# Patient Record
Sex: MALE | Race: OTHER | HISPANIC OR LATINO | URBAN - METROPOLITAN AREA
[De-identification: names, ages, dates, MRNs, and addresses within clinical notes are randomized per-mention and may not be internally consistent; named-entity substitution may affect disease eponyms.]

---

## 2024-10-14 ENCOUNTER — INPATIENT (INPATIENT)
Facility: HOSPITAL | Age: 49
LOS: 1 days | Discharge: LEFT BEFORE DISCHARGE | DRG: 281 | End: 2024-10-16
Attending: INTERNAL MEDICINE | Admitting: INTERNAL MEDICINE
Payer: COMMERCIAL

## 2024-10-14 VITALS — RESPIRATION RATE: 24 BRPM | WEIGHT: 169.98 LBS | HEIGHT: 70 IN | HEART RATE: 248 BPM

## 2024-10-14 DIAGNOSIS — I47.20 VENTRICULAR TACHYCARDIA, UNSPECIFIED: ICD-10-CM

## 2024-10-14 DIAGNOSIS — F14.10 COCAINE ABUSE, UNCOMPLICATED: ICD-10-CM

## 2024-10-14 DIAGNOSIS — E78.5 HYPERLIPIDEMIA, UNSPECIFIED: ICD-10-CM

## 2024-10-14 DIAGNOSIS — Z29.9 ENCOUNTER FOR PROPHYLACTIC MEASURES, UNSPECIFIED: ICD-10-CM

## 2024-10-14 DIAGNOSIS — I25.10 ATHEROSCLEROTIC HEART DISEASE OF NATIVE CORONARY ARTERY WITHOUT ANGINA PECTORIS: ICD-10-CM

## 2024-10-14 DIAGNOSIS — Z95.5 PRESENCE OF CORONARY ANGIOPLASTY IMPLANT AND GRAFT: Chronic | ICD-10-CM

## 2024-10-14 LAB
ALBUMIN SERPL ELPH-MCNC: 3.7 G/DL — SIGNIFICANT CHANGE UP (ref 3.3–5)
ALP SERPL-CCNC: 49 U/L — SIGNIFICANT CHANGE UP (ref 40–120)
ALT FLD-CCNC: 72 U/L — SIGNIFICANT CHANGE UP (ref 12–78)
ANION GAP SERPL CALC-SCNC: 12 MMOL/L — SIGNIFICANT CHANGE UP (ref 5–17)
APTT BLD: 35.5 SEC — SIGNIFICANT CHANGE UP (ref 24.5–35.6)
AST SERPL-CCNC: 70 U/L — HIGH (ref 15–37)
BASOPHILS # BLD AUTO: 0.06 K/UL — SIGNIFICANT CHANGE UP (ref 0–0.2)
BASOPHILS NFR BLD AUTO: 1 % — SIGNIFICANT CHANGE UP (ref 0–2)
BILIRUB SERPL-MCNC: 0.5 MG/DL — SIGNIFICANT CHANGE UP (ref 0.2–1.2)
BUN SERPL-MCNC: 19 MG/DL — SIGNIFICANT CHANGE UP (ref 7–23)
CALCIUM SERPL-MCNC: 9.5 MG/DL — SIGNIFICANT CHANGE UP (ref 8.5–10.1)
CHLORIDE SERPL-SCNC: 107 MMOL/L — SIGNIFICANT CHANGE UP (ref 96–108)
CO2 SERPL-SCNC: 24 MMOL/L — SIGNIFICANT CHANGE UP (ref 22–31)
CREAT SERPL-MCNC: 1.2 MG/DL — SIGNIFICANT CHANGE UP (ref 0.5–1.3)
EGFR: 75 ML/MIN/1.73M2 — SIGNIFICANT CHANGE UP
EOSINOPHIL # BLD AUTO: 0.21 K/UL — SIGNIFICANT CHANGE UP (ref 0–0.5)
EOSINOPHIL NFR BLD AUTO: 3.4 % — SIGNIFICANT CHANGE UP (ref 0–6)
GLUCOSE SERPL-MCNC: 179 MG/DL — HIGH (ref 70–99)
HCT VFR BLD CALC: 47.1 % — SIGNIFICANT CHANGE UP (ref 39–50)
HGB BLD-MCNC: 16.1 G/DL — SIGNIFICANT CHANGE UP (ref 13–17)
IMM GRANULOCYTES NFR BLD AUTO: 0.5 % — SIGNIFICANT CHANGE UP (ref 0–0.9)
INR BLD: 1.02 RATIO — SIGNIFICANT CHANGE UP (ref 0.85–1.16)
LYMPHOCYTES # BLD AUTO: 2.71 K/UL — SIGNIFICANT CHANGE UP (ref 1–3.3)
LYMPHOCYTES # BLD AUTO: 43.5 % — SIGNIFICANT CHANGE UP (ref 13–44)
MAGNESIUM SERPL-MCNC: 2.3 MG/DL — SIGNIFICANT CHANGE UP (ref 1.6–2.6)
MCHC RBC-ENTMCNC: 32.2 PG — SIGNIFICANT CHANGE UP (ref 27–34)
MCHC RBC-ENTMCNC: 34.2 GM/DL — SIGNIFICANT CHANGE UP (ref 32–36)
MCV RBC AUTO: 94.2 FL — SIGNIFICANT CHANGE UP (ref 80–100)
MONOCYTES # BLD AUTO: 0.71 K/UL — SIGNIFICANT CHANGE UP (ref 0–0.9)
MONOCYTES NFR BLD AUTO: 11.4 % — SIGNIFICANT CHANGE UP (ref 2–14)
NEUTROPHILS # BLD AUTO: 2.51 K/UL — SIGNIFICANT CHANGE UP (ref 1.8–7.4)
NEUTROPHILS NFR BLD AUTO: 40.2 % — LOW (ref 43–77)
NRBC # BLD: 0 /100 WBCS — SIGNIFICANT CHANGE UP (ref 0–0)
PLATELET # BLD AUTO: 408 K/UL — HIGH (ref 150–400)
POTASSIUM SERPL-MCNC: 4.1 MMOL/L — SIGNIFICANT CHANGE UP (ref 3.5–5.3)
POTASSIUM SERPL-SCNC: 4.1 MMOL/L — SIGNIFICANT CHANGE UP (ref 3.5–5.3)
PROT SERPL-MCNC: 8.2 G/DL — SIGNIFICANT CHANGE UP (ref 6–8.3)
PROTHROM AB SERPL-ACNC: 12 SEC — SIGNIFICANT CHANGE UP (ref 9.9–13.4)
RBC # BLD: 5 M/UL — SIGNIFICANT CHANGE UP (ref 4.2–5.8)
RBC # FLD: 13.1 % — SIGNIFICANT CHANGE UP (ref 10.3–14.5)
SODIUM SERPL-SCNC: 143 MMOL/L — SIGNIFICANT CHANGE UP (ref 135–145)
TROPONIN I, HIGH SENSITIVITY RESULT: 2185.9 NG/L — HIGH
TROPONIN I, HIGH SENSITIVITY RESULT: 34.1 NG/L — SIGNIFICANT CHANGE UP
WBC # BLD: 6.23 K/UL — SIGNIFICANT CHANGE UP (ref 3.8–10.5)
WBC # FLD AUTO: 6.23 K/UL — SIGNIFICANT CHANGE UP (ref 3.8–10.5)

## 2024-10-14 PROCEDURE — 93010 ELECTROCARDIOGRAM REPORT: CPT

## 2024-10-14 PROCEDURE — 99291 CRITICAL CARE FIRST HOUR: CPT

## 2024-10-14 PROCEDURE — 71045 X-RAY EXAM CHEST 1 VIEW: CPT | Mod: 26

## 2024-10-14 PROCEDURE — 99223 1ST HOSP IP/OBS HIGH 75: CPT

## 2024-10-14 PROCEDURE — 99222 1ST HOSP IP/OBS MODERATE 55: CPT | Mod: GC

## 2024-10-14 RX ORDER — ASPIRIN 325 MG
81 TABLET ORAL DAILY
Refills: 0 | Status: DISCONTINUED | OUTPATIENT
Start: 2024-10-15 | End: 2024-10-16

## 2024-10-14 RX ORDER — ETOMIDATE INJECTION 2 MG/ML
10 SOLUTION INTRAVENOUS ONCE
Refills: 0 | Status: COMPLETED | OUTPATIENT
Start: 2024-10-14 | End: 2024-10-14

## 2024-10-14 RX ORDER — ASPIRIN 325 MG
325 TABLET ORAL ONCE
Refills: 0 | Status: COMPLETED | OUTPATIENT
Start: 2024-10-14 | End: 2024-10-14

## 2024-10-14 RX ORDER — LIDOCAINE HCL 20 MG/ML
1 AMPUL (ML) INJECTION
Qty: 2 | Refills: 0 | Status: DISCONTINUED | OUTPATIENT
Start: 2024-10-14 | End: 2024-10-15

## 2024-10-14 RX ORDER — PANTOPRAZOLE SODIUM 40 MG/1
40 TABLET, DELAYED RELEASE ORAL DAILY
Refills: 0 | Status: DISCONTINUED | OUTPATIENT
Start: 2024-10-14 | End: 2024-10-15

## 2024-10-14 RX ORDER — SODIUM CHLORIDE 0.9 % (FLUSH) 0.9 %
1000 SYRINGE (ML) INJECTION ONCE
Refills: 0 | Status: COMPLETED | OUTPATIENT
Start: 2024-10-14 | End: 2024-10-14

## 2024-10-14 RX ORDER — ATORVASTATIN CALCIUM 10 MG/1
80 TABLET, FILM COATED ORAL AT BEDTIME
Refills: 0 | Status: DISCONTINUED | OUTPATIENT
Start: 2024-10-14 | End: 2024-10-16

## 2024-10-14 RX ADMIN — Medication 7.5 MG/MIN: at 21:03

## 2024-10-14 RX ADMIN — ATORVASTATIN CALCIUM 80 MILLIGRAM(S): 10 TABLET, FILM COATED ORAL at 22:33

## 2024-10-14 RX ADMIN — Medication 325 MILLIGRAM(S): at 20:52

## 2024-10-14 RX ADMIN — Medication 6 MILLIGRAM(S): at 18:31

## 2024-10-14 RX ADMIN — ETOMIDATE INJECTION 10 MILLIGRAM(S): 2 SOLUTION INTRAVENOUS at 18:31

## 2024-10-14 RX ADMIN — Medication 4700 UNIT(S): at 22:33

## 2024-10-14 RX ADMIN — Medication 2000 MILLILITER(S): at 19:06

## 2024-10-14 RX ADMIN — Medication 950 UNIT(S)/HR: at 22:26

## 2024-10-14 NOTE — ED PROVIDER NOTE - OBJECTIVE STATEMENT
Patient with a past medical history of coronary artery disease who does not take medication regularly is presenting with palpitations, chest pain and dizziness.  States about 20 minutes prior to arrival, developed palpitations and the other symptoms above, was also diaphoretic.  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  No history of similar episode.

## 2024-10-14 NOTE — ED ADULT TRIAGE NOTE - CHIEF COMPLAINT QUOTE
Pt BIBA from work at the [employer] for tachycardia, dizziness and SOB. Pt found to have HR in 240's. UTO BP during triage or en route with EMS. Hx 2 MI's

## 2024-10-14 NOTE — H&P ADULT - NSICDXPASTMEDICALHX_GEN_ALL_CORE_FT
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

## 2024-10-14 NOTE — ED PROVIDER NOTE - CLINICAL SUMMARY MEDICAL DECISION MAKING FREE TEXT BOX
Patient found to be in a wide-complex tachycardia with heart rate in the 260s, concern for ventricular tachycardia.  Patient mentating well at that time but we were otherwise unable to obtain a blood pressure.  While attempting to get set up for cardioversion, adenosine was given without response.  Afterwards patient started vomiting but was otherwise still mentating.  Given the acuity and emergent nature of the situation, it was decided to perform a synchronized cardioversion for this patient.  Verbal consent was obtained due to the emergent nature of this condition.  Dr. Coleman from cardiology was at bedside as well during this procedure.  Afterwards, patient converted to sinus rhythm.  Endorsed using cocaine yesterday.  States he is feeling better.  Interventional cardiology was also consulted by Dr. Coleman, states that they will watch him overnight and possibly do a catheterization tomorrow.  Case discussed with Dr. Coleman, will withhold antiarrhythmic at this time as his symptoms were likely incited from cocaine use yesterday.

## 2024-10-14 NOTE — H&P ADULT - HISTORY OF PRESENT ILLNESS
yo F/M with PMH presents to the ED with     Denies fever, chills, chest pain, palpitations, SOB, cough, abdominal pain, nausea, vomiting, diarrhea, constipation, urinary frequency, urgency, or dysuria, headaches, changes in vision, dizziness, numbness, tingling.  Denies recent travel, recent antibiotic use, or sick contacts.    ED Course:   Vitals: BP: , HR: , Temp: , RR: , SpO2: % on   Labs:    ABG: pH: , PO2: , PCO2: , HCO3: , SpO2: %  UA:   CXR: as per personal read, official read pending   CT:  EKG:   Received in the ED    48 yo M with PMHx of coronary artery disease, htn who does not take medication regularly is presenting with palpitations, chest pain and dizziness.  States about 20 minutes prior to arrival, developed palpitations and the other symptoms above, was also diaphoretic.  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  No history of similar episode.    Denies fever, chills, chest pain, palpitations, SOB, cough, abdominal pain, nausea, vomiting, diarrhea, constipation, urinary frequency, urgency, or dysuria, headaches, changes in vision, dizziness, numbness, tingling.  Denies recent travel, recent antibiotic use, or sick contacts.    ED Course:   Vitals: BP: , HR: , Temp: , RR: , SpO2: % on   Labs:    ABG: pH: , PO2: , PCO2: , HCO3: , SpO2: %  UA:   CXR: as per personal read, official read pending   CT:  EKG:   Received in the ED    48 yo M with PMHx of coronary artery disease (s/p 7 stents), htn who does not take medication regularly is presenting with palpitations, chest pain and dizziness.  States about 20 minutes prior to arrival, developed palpitations and the other symptoms above, was also diaphoretic.  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  No history of similar episode.      ED Course:   Vitals: BP: , HR: , Temp: , RR: , SpO2: % on   Labs:    ABG: pH: , PO2: , PCO2: , HCO3: , SpO2: %  UA:   CXR: as per personal read, official read pending   CT:  EKG:   Received in the ED    48 yo M with PMHx of coronary artery disease (s/p 7 stents, most recently in 2017), 2 MI (2001, 2017), HLD, prior DVT in upper extremity, recent stomach cancer s/p chemo, cocaine use, who does not take medication regularly (inconsistently taking ASA 81mg, stopped his statin, losartan, metoprolol) is presenting with palpitations, chest pain and dizziness.  States about 20 minutes prior to arrival, developed palpitations and the other symptoms above, was also diaphoretic.  Went to [employer] [facility] where he works and 911 was called and they brought him to ED for evaluation. Endorses using cocaine 2 days ago, uses it a couple of times a month for the past few years.  No history of similar episode. At time of interview, patient had no acute complaints and denied chest pain, SOB, nausea/vomiting.      ED Course:   Vitals: BP: 112/77, HR: 248, Temp: pending, RR: 24, SpO2: 95% on nonrebreather  Labs: platelet 408, glucose 179, AST 70  CXR: cardiomegaly, b/l haziness throughout all lung fields, possible poor positioning during XR, as per personal read, official read pending   EKG on arrival: wide QRS tachycardia with PVCs, right ventricular hypertrophy, ventricular rate 266, QTc 421 --> sinus rhythm with PVcs, old inferior and old anterolateral infarcts, ventricular rate 84 --> sinus rhythm with sinus arrhythmia, low voltage QRS, old inferior and old anterolateral infarcts, ventricular rate 68  Received in the ED: adenosine 6mg IV, ASA 325mg PO, etomidate 10mg IV, NS 1L IV bolus, lidocaine drip 1mg/min 50 yo M with PMHx of coronary artery disease (s/p 7 stents, most recently in 2017), 2 MI (2001, 2017), HLD, prior DVT in upper extremity, recent stomach cancer s/p chemo, cocaine use, who does not take medication regularly (inconsistently taking ASA 81mg, stopped his statin, losartan, metoprolol) is presenting with palpitations, chest pain and dizziness.  States about 20 minutes prior to arrival, developed palpitations and the other symptoms above, was also diaphoretic.  Went to [employer] [facility] where he works and 911 was called and they brought him to ED for evaluation. Endorses using cocaine 2 days ago, uses it a couple of times a month for the past few years.  No history of similar episode. At time of interview, patient had no acute complaints and denied chest pain, SOB, nausea/vomiting.      ED Course:   Vitals: BP: 112/77, HR: 248, Temp: pending, RR: 24, SpO2: 95% on nonrebreather  Labs: platelet 408, glucose 179, AST 70, Trop wnl x1, 2nd trop 2185  CXR: cardiomegaly, b/l haziness throughout all lung fields, possible poor positioning during XR, as per personal read, official read pending   EKG on arrival: wide QRS tachycardia with PVCs, right ventricular hypertrophy, ventricular rate 266, QTc 421 --> sinus rhythm with PVcs, old inferior and old anterolateral infarcts, ventricular rate 84 --> sinus rhythm with sinus arrhythmia, low voltage QRS, old inferior and old anterolateral infarcts, ventricular rate 68  Received in the ED: adenosine 6mg IV, ASA 325mg PO, etomidate 10mg IV, NS 1L IV bolus, lidocaine drip 1mg/min 48 yo M with PMHx of coronary artery disease (s/p 7 stents, most recently in 2017), 2 MI (2001, 2017), HLD, prior DVT in upper extremity, recent stomach cancer s/p chemo, cocaine use, who does not take medication regularly (inconsistently taking ASA 81mg, stopped his statin, losartan, metoprolol) is presenting with palpitations, chest pain and dizziness.  States about 20 minutes prior to arrival, developed palpitations and the other symptoms above, was also diaphoretic.  Went to [employer] [facility] where he works and 911 was called and they brought him to ED for evaluation. Endorses using cocaine 2 days ago, uses it a couple of times a month for the past few years.  No history of similar episode. At time of interview, patient had no acute complaints and denied chest pain, SOB, nausea/vomiting.    ED Course:   Vitals: BP: 112/77, HR: 248, Temp: pending, RR: 24, SpO2: 95% on nonrebreather  Labs: platelet 408, glucose 179, AST 70, Trop wnl x1, 2nd trop 2185  CXR: cardiomegaly, b/l haziness throughout all lung fields, possible poor positioning during XR, as per personal read, official read pending   EKG on arrival: wide QRS tachycardia with PVCs, right ventricular hypertrophy, ventricular rate 266, QTc 421 --> sinus rhythm with PVcs, old inferior and old anterolateral infarcts, ventricular rate 84 --> sinus rhythm with sinus arrhythmia, low voltage QRS, old inferior and old anterolateral infarcts, ventricular rate 68  Received in the ED: adenosine 6mg IV, ASA 325mg PO, etomidate 10mg IV, NS 1L IV bolus, lidocaine drip 1mg/min 50 yo M with PMHx of coronary artery disease (s/p 7 stents, most recently in 2017), 2 NSTEMI (2001, 2017), cocaine use, HLD, prior DVT in upper extremity, recent stomach cancer s/p chemo, who does not take medication regularly (inconsistently taking ASA 81mg, stopped his statin, losartan, metoprolol) is presenting with palpitations, chest pain and dizziness.  States about 20 minutes prior to arrival, developed palpitations and the other symptoms above, was also diaphoretic.  Went to [employer] [facility] where he works and 911 was called and they brought him to ED for evaluation. Endorses using cocaine 2 days ago, uses it a couple of times a month for the past few years.  No history of similar episode. At time of interview, patient had no acute complaints and denied chest pain, SOB, nausea/vomiting.    ED Course:   Vitals: BP: 112/77, HR: 248, Temp: pending, RR: 24, SpO2: 95% on nonrebreather  Labs: platelet 408, glucose 179, AST 70, Trop wnl x1, 2nd trop 2185  CXR: cardiomegaly, b/l haziness throughout all lung fields, possible poor positioning during XR, as per personal read, official read pending   EKG on arrival: wide QRS tachycardia with PVCs, right ventricular hypertrophy, ventricular rate 266, QTc 421 --> sinus rhythm with PVcs, old inferior and old anterolateral infarcts, ventricular rate 84 --> sinus rhythm with sinus arrhythmia, low voltage QRS, old inferior and old anterolateral infarcts, ventricular rate 68  Received in the ED: adenosine 6mg IV, ASA 325mg PO, etomidate 10mg IV, NS 1L IV bolus, lidocaine drip 1mg/min 50 yo M with PMHx of NSTEMI (2001, 2017) s/p 7 stents, coronary artery disease, cocaine use, HLD, prior DVT in upper extremity, recent stomach cancer s/p chemo, who does not take medication regularly (inconsistently taking ASA 81mg, stopped his statin, losartan, metoprolol) is presenting with palpitations, chest pain and dizziness.  States about 20 minutes prior to arrival, developed palpitations and the other symptoms above, was also diaphoretic.  Went to [facility] center where he works and 911 was called and they brought him to ED for evaluation. Endorses using cocaine 2 days ago, uses it a couple of times a month for the past few years.  No history of similar episode. At time of interview, patient had no acute complaints and denied chest pain, SOB, nausea/vomiting.    ED Course:   Vitals: BP: 112/77, HR: 248, Temp: pending, RR: 24, SpO2: 95% on nonrebreather  Labs: platelet 408, glucose 179, AST 70, Trop wnl x1, 2nd trop 2185  CXR: cardiomegaly, b/l haziness throughout all lung fields, possible poor positioning during XR, as per personal read, official read pending   EKG on arrival: wide QRS tachycardia with PVCs, right ventricular hypertrophy, ventricular rate 266, QTc 421 --> sinus rhythm with PVcs, old inferior and old anterolateral infarcts, ventricular rate 84 --> sinus rhythm with sinus arrhythmia, low voltage QRS, old inferior and old anterolateral infarcts, ventricular rate 68  Received in the ED: adenosine 6mg IV, ASA 325mg PO, etomidate 10mg IV, NS 1L IV bolus, lidocaine drip 1mg/min

## 2024-10-14 NOTE — ED PROVIDER NOTE - PHYSICAL EXAMINATION
Constitutional: Awake, Alert, acutely ill, diaphoretic  HEAD: Normocephalic, atraumatic.   EYES: PERRL, EOM intact, conjunctiva and sclera are clear bilaterally.  ENT: External ears normal. No rhinorrhea, no tracheal deviation   NECK: Supple, non-tender  CARDIOVASCULAR: tachycardic rate and rhythm.  RESPIRATORY: Normal respiratory effort; breath sounds CTAB, no wheezes, rhonchi, or rales.   ABDOMEN: Soft; non-tender, non-distended. No rebound or guarding.   MSK:  no lower extremity edema, no deformities  SKIN: diaphoretic  NEURO: A&O x3. Sensory and motor functions are grossly intact. Speech is normal. No facial droop.

## 2024-10-14 NOTE — ED PROCEDURE NOTE - CPROC ED TIME OUT STATEMENT1
“Patient's name, , procedure and correct site were confirmed during the [place] Timeout.”
“Patient's name, , procedure and correct site were confirmed during the [place] Timeout.”

## 2024-10-14 NOTE — CONSULT NOTE ADULT - SUBJECTIVE AND OBJECTIVE BOX
49y  Male  No Known Allergies    Patient is a 49y old  Male who presented with a chief complaint of  SOB, palpitations and tachycardia    HPI: 49 year old male PMHx of HTN and CAD (7 stents) who admited to cocaine yesterday presented to ER via EMS with of SOB, palpitations, tachycardia. He was diaphoretic and tachycardic found to be in wide complex tachycardia with rate upwards of at 250 -270 . Given the inability to obtain a BP at that time the decision made for cardioversion/defibrillation x1,  and he converted to normal sinus rhythm . Post cardioversion EKG showed NSR with anterior infarct/ischemia and at this time he is awake alert and oriented x3,  hemodynamically stable normal blood pressure, heart rate in the 80's with no complaints     PAST MEDICAL & SURGICAL HISTORY:  HTN (hypertension)  Cocaine abuse    Vital Signs Last 24 Hrs  T(C): --  T(F): --  HR: 83 (14 Oct 2024 18:36) (55 - 269)  BP: 113/88 (14 Oct 2024 18:36) (112/77 - 113/88)  BP(mean): --  RR: 18 (14 Oct 2024 18:36) (18 - 25)  SpO2: 95% (14 Oct 2024 18:36) (94% - 96%)    Parameters below as of 14 Oct 2024 18:36  Patient On (Oxygen Delivery Method): mask, nonrebreather    LABS  10-14  143  |  107  |  19  ----------------------------<  179[H]  4.1   |  24  |  1.20  Ca    9.5      14 Oct 2024 18:30  Mg     2.3     10-14  TPro  8.2  /  Alb  3.7  /  TBili  0.5  /  DBili  x   /  AST  70[H]  /  ALT  72  /  AlkPhos  49  10-14                        16.1   6.23  )-----------( 408      ( 14 Oct 2024 18:30 )             47.1   PT/INR - ( 14 Oct 2024 18:30 )   PT: 12.0 sec;   INR: 1.02 ratio     PTT - ( 14 Oct 2024 18:30 )  PTT:35.5 sec    LIVER FUNCTIONS - ( 14 Oct 2024 18:30 )  Alb: 3.7 g/dL / Pro: 8.2 g/dL / ALK PHOS: 49 U/L / ALT: 72 U/L / AST: 70 U/L / GGT: x           CAPILLARY BLOOD GLUCOSE  POCT Blood Glucose.: 169 mg/dL (14 Oct 2024 18:23)    Urinalysis Basic - ( 14 Oct 2024 18:30 )  Color: x / Appearance: x / SG: x / pH: x  Gluc: 179 mg/dL / Ketone: x  / Bili: x / Urobili: x   Blood: x / Protein: x / Nitrite: x   Leuk Esterase: x / RBC: x / WBC x   Sq Epi: x / Non Sq Epi: x / Bacteria: x    MEDICATIONS  (STANDING):  heparin   Injectable 5000 Unit(s) SubCutaneous every 12 hours  lidocaine   Infusion 1 mG/Min (7.5 mL/Hr) IV Continuous <Continuous>  pantoprazole  Injectable 40 milliGRAM(s) IV Push daily      REVIEW OF SYSTEMS:  CONSTITUTIONAL: No fever, weight loss, or fatigue  EYES: No eye pain, visual disturbances, or discharge  ENMT:  No difficulty hearing, tinnitus, vertigo; No sinus or throat pain  NECK: No pain or stiffness  BREASTS: No pain, masses, or nipple discharge  RESPIRATORY: No cough, wheezing, chills or hemoptysis; No shortness of breath  CARDIOVASCULAR: No chest pain, palpitations, dizziness, or leg swelling  GASTROINTESTINAL: No abdominal or epigastric pain. No nausea, vomiting, or hematemesis; No diarrhea or constipation. No melena or hematochezia.  GENITOURINARY: No dysuria, frequency, hematuria, or incontinence  NEUROLOGICAL: No headaches, memory loss, loss of strength, numbness, or tremors  SKIN: No itching, burning, rashes, or lesions   LYMPH NODES: No enlarged glands  ENDOCRINE: No heat or cold intolerance; No hair loss  MUSCULOSKELETAL: No joint pain or swelling; No muscle, back, or extremity pain  PSYCHIATRIC: No depression, anxiety, mood swings, or difficulty sleeping  HEME/LYMPH: No easy bruising, or bleeding gums  ALLERY AND IMMUNOLOGIC: No hives or eczema      Physicial Exam:   Constitutional: NAD, well-groomed, well-developed  HEENT: PERRLA, EOMI, no drainage or redness  Neck: No bruits; no thyromegaly or nodules,  No JVD  Back: Normal spine flexure, No CVA tenderness, No deformity or limitation of movement  Respiratory: Breath Sounds equal & clear to percussion & auscultation, no accessory muscle use  Cardiovascular: Regular rate & rhythm, normal S1, S2; no murmurs, gallops or rubs; no S3, S4  Gastrointestinal: Soft, non-tender, non distended no hepatosplenomegaly, normal bowel sounds  Extremities: No peripheral edema, No cyanosis, clubbing   Vascular: Equal and normal pulses: 2+ peripheral pulses throughout  Neurological: GCS:    A&O x 3; no sensory, motor  deficits, normal reflexes  Psychiatric: Normal mood, normal affect  Musculoskeletal: No joint pain, swelling or deformity; no limitation of movement  Skin: No rashes

## 2024-10-14 NOTE — ED ADULT NURSE NOTE - ED STAT RN HANDOFF DETAILS
Report given to EBONY Miranda in ICU. Pt taken to ICU w/ ED transport and RN escort in stable condition.

## 2024-10-14 NOTE — H&P ADULT - PROBLEM SELECTOR PLAN 2
- Chronic  - Pt formerly took statin, losartan, metoprolol, now takes asa 81mg inconsistently  - Avoid beta blocker in setting of cocaine use  - S/p  mg, then start 81 mg daily  - Plan per ICU

## 2024-10-14 NOTE — CONSULT NOTE ADULT - SUBJECTIVE AND OBJECTIVE BOX
[facility] - Ignacio Santos, Tahir Brandon Savella, Cohen  Office Number: [phone]    Initial Consult Note    CHIEF COMPLAINT: Patient is a 49y old  Male who presents with a chief complaint of     HPI:  49 year old male with CAD (reports 7 stents in past), here with palpitations/dyspnea/tachycardia, brought in by EMS.  Cannot recall the name of his cardiologist, and reports taking only a baby asa.  On arrival, appears diaphoretic, nausea/vomiting, and tachycardic. Found to be in a WCT at 250+ bpm.  Unable to obtain a BP  Decision made for cardioversion/defibrillation, given 1 shock and went back into sr  Post VT ekg with sr with anterior infarct/ischemia  feeling well post cardioversion  does admit to cocaine yesterday    PAST MEDICAL & SURGICAL HISTORY:      SOCIAL HISTORY:  No tobacco, ethanol,, +drug use    FAMILY HISTORY:    No family history of acute MI or sudden cardiac death.    MEDICATIONS  (STANDING):    MEDICATIONS  (PRN):      Allergies    No Known Allergies    Intolerances        REVIEW OF SYSTEMS:    CONSTITUTIONAL: No weakness, fevers or chills  EYES/ENT: No visual changes;  No vertigo or throat pain   NECK: No pain or stiffness  RESPIRATORY: No cough, wheezing, hemoptysis; No shortness of breath  CARDIOVASCULAR: No chest pain or palpitations  GASTROINTESTINAL: No abdominal pain. No nausea, vomiting, or hematemesis; No diarrhea or constipation. No melena or hematochezia.  GENITOURINARY: No dysuria, frequency or hematuria  NEUROLOGICAL: No numbness or weakness  SKIN: No itching or rash  All other review of systems is negative unless indicated above    VITAL SIGNS:   Vital Signs Last 24 Hrs  T(C): --  T(F): --  HR: 83 (14 Oct 2024 18:36) (55 - 269)  BP: 113/88 (14 Oct 2024 18:36) (112/77 - 113/88)  BP(mean): --  RR: 18 (14 Oct 2024 18:36) (18 - 25)  SpO2: 95% (14 Oct 2024 18:36) (94% - 96%)    Parameters below as of 14 Oct 2024 18:36  Patient On (Oxygen Delivery Method): mask, nonrebreather        I&O's Summary      On Exam:    Constitutional: alert and oriented x 3, diaphoretic  Lungs:  Non-labored, breath sounds are decreased bilaterally, No wheezing, rales or rhonchi  Cardiovascular: tachy,  S1 and S2 positive.  No murmurs, rubs, gallops or clicks  Gastrointestinal: Bowel Sounds present, soft, nontender.   Lymph: No peripheral edema. No cervical lymphadenopathy.  Neurological: Alert, no focal deficits  Skin: No rashes or ulcers   Psych:  Mood & affect appropriate.    LABS: All Labs Reviewed:                        16.1   6.23  )-----------( 408      ( 14 Oct 2024 18:30 )             47.1     14 Oct 2024 18:30    143    |  107    |  19     ----------------------------<  179    4.1     |  24     |  1.20     Ca    9.5        14 Oct 2024 18:30  Mg     2.3       14 Oct 2024 18:30    TPro  8.2    /  Alb  3.7    /  TBili  0.5    /  DBili  x      /  AST  70     /  ALT  72     /  AlkPhos  49     14 Oct 2024 18:30    PT/INR - ( 14 Oct 2024 18:30 )   PT: 12.0 sec;   INR: 1.02 ratio         PTT - ( 14 Oct 2024 18:30 )  PTT:35.5 sec      Blood Culture:         RADIOLOGY:    EKG: wct

## 2024-10-14 NOTE — H&P ADULT - PROBLEM SELECTOR PLAN 1
- - Pt admitted for symptomatic VTach, s/p defibrillation/cardioversion 200J  - Trop wnl x1, 2nd trop 2185  - S/p adenosine 6mg IV, ASA 325mg PO, etomidate 10mg IV, NS 1L IV bolus, lidocaine drip 1mg/min  - Admit to ICU for further management, at risk for decompensation  - Per cardio, heparin gtt and acs protocol given 2nd trop elevated  - Plan for cath in AM  - NPO after midnight  - Avoid beta blocker in setting of cocaine use  - Recommend check TTE  - trend creatinine and electrolytes. Keep K>4, mg>2  - very high risk of decompensation  - Plan per ICU team  - Cardio Dr. Coleman consulted, recs appreciated - Pt admitted for symptomatic VTach, s/p defibrillation/cardioversion 200J  - Trop wnl x1, 2nd trop 2185  - S/p adenosine 6mg IV, ASA 325mg PO, etomidate 10mg IV, NS 1L IV bolus, lidocaine drip 1mg/min  - Admit to ICU for further management, at risk for decompensation  - Per cardio, start heparin gtt and acs protocol given 2nd trop elevated  - Continue lidocaine gtt  - Plan for cath in AM  - NPO after midnight  - Avoid beta blocker in setting of cocaine use  - Pantoprazole for GI ppx  - Recommend check TTE  - trend creatinine and electrolytes. Keep K>4, mg>2  - very high risk of decompensation  - Plan per ICU team  - Cardio Dr. Coleman consulted, recs appreciated - Pt admitted for symptomatic VTach, s/p defibrillation/cardioversion 200J - likely ichemic heart disease in the setting of cocaine abuse and CAD  - Trop wnl x1, 2nd trop 2185  - S/p adenosine 6mg IV, ASA 325mg PO, etomidate 10mg IV, NS 1L IV bolus, lidocaine drip 1mg/min  - Admit to ICU for further management, at risk for decompensation  - Per cardio, start heparin gtt and acs protocol given 2nd trop elevated  - Continue lidocaine gtt  - Plan for cath in AM  - NPO after midnight  - Avoid beta blocker in setting of cocaine use  - Pantoprazole for GI ppx  - Recommend check TTE  - trend creatinine and electrolytes. Keep K>4, mg>2  - very high risk of decompensation  - Plan per ICU team  - Cardio Dr. Coleman consulted, recs appreciated - Pt admitted for symptomatic VTach, s/p defibrillation/cardioversion 200J - likely ichemic heart disease in the setting of cocaine abuse and CAD  - Trop wnl x1, 2nd trop 2185  - EKG on arrival: wide QRS tachycardia with PVCs, right ventricular hypertrophy, ventricular rate 266, QTc 421 --> sinus rhythm with PVcs, old inferior and old anterolateral infarcts, ventricular rate 84 --> sinus rhythm with sinus arrhythmia, low voltage QRS, old inferior and old anterolateral infarcts, ventricular rate 68  - S/p adenosine 6mg IV, ASA 325mg PO, etomidate 10mg IV, NS 1L IV bolus, lidocaine drip 1mg/min  - Admit to ICU for further management, at risk for decompensation  - Per cardio, start heparin gtt and acs protocol given 2nd trop elevated  - Continue lidocaine gtt  - Plan for cath in AM  - NPO after midnight  - Avoid beta blocker in setting of cocaine use  - Pantoprazole for GI ppx  - Recommend check TTE  - Trend creatinine and electrolytes. Keep K>4, mg>2  - Very high risk of decompensation  - Cardio Dr. Coleman consulted, recs appreciated  - Plan per ICU team - Pt with PMHx NSTEMI s/p 7 stents, admitted for symptomatic VTach, s/p defibrillation/cardioversion 200J - likely ichemic heart disease in the setting of cocaine abuse and CAD  - Trop wnl x1, 2nd trop 2185  - EKG on arrival: wide QRS tachycardia with PVCs, right ventricular hypertrophy, ventricular rate 266, QTc 421 --> sinus rhythm with PVcs, old inferior and old anterolateral infarcts, ventricular rate 84 --> sinus rhythm with sinus arrhythmia, low voltage QRS, old inferior and old anterolateral infarcts, ventricular rate 68  - S/p adenosine 6mg IV, ASA 325mg PO, etomidate 10mg IV, NS 1L IV bolus, lidocaine drip 1mg/min  - Admit to ICU for further management, at risk for decompensation  - Per cardio, start heparin gtt and acs protocol given 2nd trop elevated  - Continue lidocaine gtt  - Plan for cath in AM  - NPO after midnight  - Avoid beta blocker in setting of cocaine use  - Pantoprazole for GI ppx  - Recommend check TTE  - Trend creatinine and electrolytes. Keep K>4, mg>2  - Very high risk of decompensation  - Cardio Dr. Coleman consulted, recs appreciated  - Plan per ICU team - Pt with PMHx NSTEMI s/p 7 stents, admitted for symptomatic VTach, s/p defibrillation/cardioversion 200J - likely ischemic heart disease in the setting of cocaine abuse and CAD  - Trop wnl x1, 2nd trop 2185  - EKG on arrival: wide QRS tachycardia with PVCs, right ventricular hypertrophy, ventricular rate 266, QTc 421 --> sinus rhythm with PVcs, old inferior and old anterolateral infarcts, ventricular rate 84 --> sinus rhythm with sinus arrhythmia, low voltage QRS, old inferior and old anterolateral infarcts, ventricular rate 68  - S/p adenosine 6mg IV, ASA 325mg PO, etomidate 10mg IV, NS 1L IV bolus, lidocaine drip 1mg/min  - Admit to ICU for further management, at risk for decompensation  - Per cardio, start heparin gtt and acs protocol given 2nd trop elevated  - Continue lidocaine gtt  - Plan for cath in AM  - NPO after midnight  - Avoid beta blocker in setting of cocaine use  - Pantoprazole for GI ppx  - Recommend check TTE  - Trend creatinine and electrolytes. Keep K>4, mg>2  - Very high risk of decompensation  - Cardio Dr. Coleman consulted, recs appreciated  - Plan per ICU team

## 2024-10-14 NOTE — H&P ADULT - NSHPPHYSICALEXAM_GEN_ALL_CORE
T(C): --  HR: 83 (10-14-24 @ 18:36) (55 - 269)  BP: 113/88 (10-14-24 @ 18:36) (112/77 - 113/88)  RR: 18 (10-14-24 @ 18:36) (18 - 25)  SpO2: 95% (10-14-24 @ 18:36) (94% - 96%)    GENERAL: patient appears well, no acute distress, appropriately interactive  EYES: sclera clear, no exudates  ENMT: oropharynx clear without erythema, no exudates, moist mucous membranes  NECK: supple, soft  LUNGS: good air entry bilaterally, clear to auscultation, symmetric breath sounds, no wheezing or rhonchi appreciated  HEART: soft S1/S2, regular rate and rhythm, no murmurs noted, no lower extremity edema  GASTROINTESTINAL: abdomen is soft, nontender, nondistended, normoactive bowel sounds  INTEGUMENT: good skin turgor, warm skin, appears well perfused  MUSCULOSKELETAL: no clubbing or cyanosis, no obvious deformity  NEUROLOGIC: awake, alert, oriented x3, good muscle tone in all 4 extremities  HEME/LYMPH: no obvious ecchymosis or petechiae T(C): --  HR: 83 (10-14-24 @ 18:36) (55 - 269)  BP: 113/88 (10-14-24 @ 18:36) (112/77 - 113/88)  RR: 18 (10-14-24 @ 18:36) (18 - 25)  SpO2: 95% (10-14-24 @ 18:36) (94% - 96%)    GENERAL: patient comfortable, no acute distress, appropriately interactive  LUNGS: good air entry bilaterally, clear to auscultation, symmetric breath sounds, no wheezing or rhonchi appreciated  HEART: soft S1/S2, regular rate and rhythm, no murmurs noted, no lower extremity edema  GASTROINTESTINAL: abdomen is soft, nontender, nondistended  INTEGUMENT: warm, dry  MUSCULOSKELETAL: no clubbing or cyanosis, no obvious deformity  NEUROLOGIC: awake and alert, good muscle tone in all 4 extremities T(C): --  HR: 83 (10-14-24 @ 18:36) (55 - 269)  BP: 113/88 (10-14-24 @ 18:36) (112/77 - 113/88)  RR: 18 (10-14-24 @ 18:36) (18 - 25)  SpO2: 95% (10-14-24 @ 18:36) (94% - 96%)    GENERAL: patient comfortable, no acute distress, appropriately interactive  EYES: sclera clear, no exudates  LUNGS: good air entry bilaterally, clear to auscultation, symmetric breath sounds, no wheezing or rhonchi appreciated  HEART: soft S1/S2, regular rate and rhythm, no murmurs noted, no lower extremity edema  GASTROINTESTINAL: abdomen is soft, nontender, nondistended  INTEGUMENT: warm, dry  MUSCULOSKELETAL: no clubbing or cyanosis, no obvious deformity  NEUROLOGIC: awake and alert, good muscle tone in all 4 extremities  HEME/LYMPH: no obvious ecchymosis or petechiae

## 2024-10-14 NOTE — H&P ADULT - ASSESSMENT
49 year old male with PMHx CAD s/p 7 stents, 2 prior MI, HLD, prior upper extremity DVT, recent stomach cancer s/p chemo, cocaine use, who presented with chest pain, palpitations, diaphoresis. Found to be in wide complex ventricular tachycardia, defibrillated x1 with return to sinus rhythm with some sinus arrhythmia. Admitted to ICU for further management. 49 year old male with PMHx NSTEMI (2001, 2017) s/p 7 stents, coronary artery disease, HLD, prior upper extremity DVT, recent stomach cancer s/p chemo, cocaine use, who presented with chest pain, palpitations, diaphoresis. Found to be in wide complex ventricular tachycardia, defibrillated x1 with return to sinus rhythm with some sinus arrhythmia. Admitted to ICU for further management.

## 2024-10-14 NOTE — ED ADULT TRIAGE NOTE - GLASGOW COMA SCALE: SCORE, MLM
The right coronary artery was selectively engaged and injected. Multiple views of the injected vessel were taken.  15

## 2024-10-14 NOTE — H&P ADULT - PROBLEM SELECTOR PLAN 4
- Pt with hx of cocaine use, states that he uses it a few times a month, every few months  - F/u Utox  - Avoid beta blocker in setting of cocaine use  - Recommend consult Dr. Young, addiction specialist  - Plan per ICU - Pt with hx of cocaine use, states that he uses it a few times a month, every few months  - F/u Utox  - Avoid beta blocker in setting of cocaine use  - Counseled patient on tobacco and cocaine cessation  - Recommend consult Dr. Young, addiction specialist  - Plan per ICU

## 2024-10-14 NOTE — ED ADULT NURSE NOTE - NSFALLHARMRISKINTERV_ED_ALL_ED
Assistance with ambulation/Communicate risk of Fall with Harm to all staff, patient, and family/Provide visual cue: red socks, yellow wristband, yellow gown, etc/Reinforce activity limits and safety measures with patient and family/Bed in lowest position, wheels locked, appropriate side rails in place/Call bell, personal items and telephone in reach/Instruct patient to call for assistance before getting out of bed/chair/stretcher/Non-slip footwear applied when patient is off stretcher/[place] [place] to call system/Physically safe environment - no spills, clutter or unnecessary equipment/Purposeful Proactive Rounding/Room/bathroom lighting operational, light cord in reach

## 2024-10-14 NOTE — ED ADULT NURSE NOTE - OBJECTIVE STATEMENT
Patient BIBEMS from work after c.o chest pain and discomfort, diaphoretic, dizzinesss and nausea. Patient unable to describe past medical hx at this time 2/2 acuity of illness - Patient HR 260BPM at this time - ekg showing VTACH  - cardio at bedside, patient placed on cardiac pacing pads. Vomit x2 in stretcher.   10mg etomidate admin prior to shock for sedation @1831. Patient refused fentanyl. Patient to be shockled @200j per CARDIO/ER doc @1832  1833 HR now 55, 26 resp, 112/74 bp  Post shock patient now NSR. Patient awake and alert, laughing and being cooperative. Patient endorses cocaine use yesterday. Pending further labs and radiology reports. Patient BIBEMS from work after c.o chest pain and discomfort, diaphoretic, dizzinesss and nausea. Patient unable to describe past medical hx at this time 2/2 acuity of illness - Patient HR 260BPM at this time - ekg showing VTACH  - cardio at bedside, patient placed on cardiac pacing pads. Vomit x2 in stretcher.   10mg etomidate admin prior to shock for sedation @1831. Patient refused fentanyl. Patient to be shockled @200j per CARDIO/ER doc @1832 1833 HR now 55, 26 resp, 112/74 bp  Post shock patient now NSR. Patient awake and alert, laughing and being cooperative. Patient endorses cocaine use yesterday. Hx of 7 stents. Pending further labs and radiology reports.

## 2024-10-14 NOTE — ED PROVIDER NOTE - PROGRESS NOTE DETAILS
Case discussed with ICU MARCOS Titus as well as hospitalist Dr. Bauman, accepted to ICU.  Manjidner Salas MD.

## 2024-10-14 NOTE — ED PROVIDER NOTE - NSTIMEPROVIDERCAREINITIATE_GEN_ER
[facility]: Tikosyn (Dofetilide) Education    Zheng Lopez was initiated on tikosyn for atrial fibrillation. Counseling points included the followin) Explained indication and need for tikosyn for atrial fibrillation, and the testing and labs needed during the initiation phase (EKGs, potassium, magnesium, renal function).  2) Went over dosing and frequency of this medication, stressing importance of taking medication every 12 hours and not missing or doubling up on doses.  3) Discussed any administration, storage, and monitoring instructions with tikosyn.  4) Discussed all important drug interactions, including antibiotics, antiemetics, over-the-counter medications and supplements.  5) Explained possible side effects for tikosyn.  6) Instructed the patient not to begin or discontinue any medications without informing his/her physician/pharmacist.    Patient expressed understanding and had no further questions.      Diane Mcgrath, Pharm.D., [facility]   Clinical Staff Pharmacist   Phone Extension #[phone]    14-Oct-2024 18:47

## 2024-10-14 NOTE — H&P ADULT - NSHPREVIEWOFSYSTEMS_GEN_ALL_CORE
CONSTITUTIONAL: denies fever, chills, fatigue, weakness  HEENT: denies blurred vision, sore throat  SKIN: denies new lesions, rash  CARDIOVASCULAR: denies chest pain, chest pressure, palpitations  RESPIRATORY: denies shortness of breath, cough, sputum production  GASTROINTESTINAL: denies nausea, vomiting, diarrhea, abdominal pain, melena or hematochezia  GENITOURINARY: denies dysuria, discharge  NEUROLOGICAL: denies numbness, headache, focal weakness  MUSCULOSKELETAL: denies new joint pain, muscle aches  HEMATOLOGIC: denies gross bleeding, bruising CONSTITUTIONAL: denies fever, chills, fatigue, weakness  CARDIOVASCULAR: + chest pain and palpitations  RESPIRATORY: + shortness of breath, denies cough, sputum production  GASTROINTESTINAL: + nausea and vomiting, denies diarrhea, abdominal pain, melena or hematochezia  NEUROLOGICAL: denies numbness, headache, focal weakness  MUSCULOSKELETAL: denies new joint pain, muscle aches

## 2024-10-14 NOTE — ED PROCEDURE NOTE - NS_POSTPROCCAREGUIDE_ED_ALL_ED
Patient is now fully awake, with vital signs and temperature stable, hydration is adequate, patients Amanda’s  score is at baseline (or greater than 8), patient and escort has received  discharge education.

## 2024-10-15 ENCOUNTER — TRANSCRIPTION ENCOUNTER (OUTPATIENT)
Age: 49
End: 2024-10-15

## 2024-10-15 ENCOUNTER — RESULT REVIEW (OUTPATIENT)
Age: 49
End: 2024-10-15

## 2024-10-15 LAB
ALBUMIN SERPL ELPH-MCNC: 3.2 G/DL — LOW (ref 3.3–5)
ALP SERPL-CCNC: 41 U/L — SIGNIFICANT CHANGE UP (ref 40–120)
ALT FLD-CCNC: 101 U/L — HIGH (ref 12–78)
ANION GAP SERPL CALC-SCNC: 6 MMOL/L — SIGNIFICANT CHANGE UP (ref 5–17)
ANION GAP SERPL CALC-SCNC: 8 MMOL/L — SIGNIFICANT CHANGE UP (ref 5–17)
APTT BLD: 76.9 SEC — HIGH (ref 24.5–35.6)
AST SERPL-CCNC: 85 U/L — HIGH (ref 15–37)
BILIRUB SERPL-MCNC: 0.4 MG/DL — SIGNIFICANT CHANGE UP (ref 0.2–1.2)
BUN SERPL-MCNC: 15 MG/DL — SIGNIFICANT CHANGE UP (ref 7–23)
BUN SERPL-MCNC: 17 MG/DL — SIGNIFICANT CHANGE UP (ref 7–23)
CALCIUM SERPL-MCNC: 8.8 MG/DL — SIGNIFICANT CHANGE UP (ref 8.5–10.1)
CALCIUM SERPL-MCNC: 8.9 MG/DL — SIGNIFICANT CHANGE UP (ref 8.5–10.1)
CHLORIDE SERPL-SCNC: 110 MMOL/L — HIGH (ref 96–108)
CHLORIDE SERPL-SCNC: 110 MMOL/L — HIGH (ref 96–108)
CO2 SERPL-SCNC: 25 MMOL/L — SIGNIFICANT CHANGE UP (ref 22–31)
CO2 SERPL-SCNC: 25 MMOL/L — SIGNIFICANT CHANGE UP (ref 22–31)
CREAT SERPL-MCNC: 0.61 MG/DL — SIGNIFICANT CHANGE UP (ref 0.5–1.3)
CREAT SERPL-MCNC: 0.71 MG/DL — SIGNIFICANT CHANGE UP (ref 0.5–1.3)
EGFR: 113 ML/MIN/1.73M2 — SIGNIFICANT CHANGE UP
EGFR: 118 ML/MIN/1.73M2 — SIGNIFICANT CHANGE UP
GLUCOSE SERPL-MCNC: 102 MG/DL — HIGH (ref 70–99)
GLUCOSE SERPL-MCNC: 98 MG/DL — SIGNIFICANT CHANGE UP (ref 70–99)
HCT VFR BLD CALC: 41.5 % — SIGNIFICANT CHANGE UP (ref 39–50)
HGB BLD-MCNC: 14 G/DL — SIGNIFICANT CHANGE UP (ref 13–17)
INR BLD: 1.15 RATIO — SIGNIFICANT CHANGE UP (ref 0.85–1.16)
MAGNESIUM SERPL-MCNC: 2.1 MG/DL — SIGNIFICANT CHANGE UP (ref 1.6–2.6)
MAGNESIUM SERPL-MCNC: 2.2 MG/DL — SIGNIFICANT CHANGE UP (ref 1.6–2.6)
MCHC RBC-ENTMCNC: 31.6 PG — SIGNIFICANT CHANGE UP (ref 27–34)
MCHC RBC-ENTMCNC: 33.7 GM/DL — SIGNIFICANT CHANGE UP (ref 32–36)
MCV RBC AUTO: 93.7 FL — SIGNIFICANT CHANGE UP (ref 80–100)
MRSA PCR RESULT.: SIGNIFICANT CHANGE UP
NRBC # BLD: 0 /100 WBCS — SIGNIFICANT CHANGE UP (ref 0–0)
PCP SPEC-MCNC: SIGNIFICANT CHANGE UP
PHOSPHATE SERPL-MCNC: 3.7 MG/DL — SIGNIFICANT CHANGE UP (ref 2.5–4.5)
PLATELET # BLD AUTO: 322 K/UL — SIGNIFICANT CHANGE UP (ref 150–400)
POTASSIUM SERPL-MCNC: 3.8 MMOL/L — SIGNIFICANT CHANGE UP (ref 3.5–5.3)
POTASSIUM SERPL-MCNC: 3.8 MMOL/L — SIGNIFICANT CHANGE UP (ref 3.5–5.3)
POTASSIUM SERPL-SCNC: 3.8 MMOL/L — SIGNIFICANT CHANGE UP (ref 3.5–5.3)
POTASSIUM SERPL-SCNC: 3.8 MMOL/L — SIGNIFICANT CHANGE UP (ref 3.5–5.3)
PROT SERPL-MCNC: 7 G/DL — SIGNIFICANT CHANGE UP (ref 6–8.3)
PROTHROM AB SERPL-ACNC: 13.4 SEC — SIGNIFICANT CHANGE UP (ref 9.9–13.4)
RBC # BLD: 4.43 M/UL — SIGNIFICANT CHANGE UP (ref 4.2–5.8)
RBC # FLD: 13.2 % — SIGNIFICANT CHANGE UP (ref 10.3–14.5)
S AUREUS DNA NOSE QL NAA+PROBE: DETECTED
SODIUM SERPL-SCNC: 141 MMOL/L — SIGNIFICANT CHANGE UP (ref 135–145)
SODIUM SERPL-SCNC: 143 MMOL/L — SIGNIFICANT CHANGE UP (ref 135–145)
TROPONIN I, HIGH SENSITIVITY RESULT: 7384.6 NG/L — HIGH
TROPONIN I, HIGH SENSITIVITY RESULT: 7433.7 NG/L — HIGH
WBC # BLD: 7.5 K/UL — SIGNIFICANT CHANGE UP (ref 3.8–10.5)
WBC # FLD AUTO: 7.5 K/UL — SIGNIFICANT CHANGE UP (ref 3.8–10.5)

## 2024-10-15 PROCEDURE — 99152 MOD SED SAME PHYS/QHP 5/>YRS: CPT

## 2024-10-15 PROCEDURE — 99233 SBSQ HOSP IP/OBS HIGH 50: CPT | Mod: GC

## 2024-10-15 PROCEDURE — 99233 SBSQ HOSP IP/OBS HIGH 50: CPT

## 2024-10-15 PROCEDURE — 93458 L HRT ARTERY/VENTRICLE ANGIO: CPT | Mod: 26

## 2024-10-15 PROCEDURE — 99291 CRITICAL CARE FIRST HOUR: CPT

## 2024-10-15 PROCEDURE — 93306 TTE W/DOPPLER COMPLETE: CPT | Mod: 26

## 2024-10-15 RX ORDER — ASPIRIN 325 MG
1 TABLET ORAL
Refills: 0 | DISCHARGE

## 2024-10-15 RX ORDER — SODIUM CHLORIDE 0.9 % (FLUSH) 0.9 %
1000 SYRINGE (ML) INJECTION
Refills: 0 | Status: DISCONTINUED | OUTPATIENT
Start: 2024-10-15 | End: 2024-10-15

## 2024-10-15 RX ORDER — CARVEDILOL 3.125 MG
3.12 TABLET ORAL EVERY 12 HOURS
Refills: 0 | Status: DISCONTINUED | OUTPATIENT
Start: 2024-10-15 | End: 2024-10-16

## 2024-10-15 RX ORDER — SODIUM CHLORIDE 0.9 % (FLUSH) 0.9 %
250 SYRINGE (ML) INJECTION ONCE
Refills: 0 | Status: COMPLETED | OUTPATIENT
Start: 2024-10-15 | End: 2024-10-15

## 2024-10-15 RX ADMIN — Medication 3.12 MILLIGRAM(S): at 17:36

## 2024-10-15 RX ADMIN — ATORVASTATIN CALCIUM 80 MILLIGRAM(S): 10 TABLET, FILM COATED ORAL at 21:42

## 2024-10-15 RX ADMIN — Medication 500 MILLILITER(S): at 08:55

## 2024-10-15 RX ADMIN — Medication 850 UNIT(S)/HR: at 06:49

## 2024-10-15 RX ADMIN — Medication 81 MILLIGRAM(S): at 08:53

## 2024-10-15 NOTE — DISCHARGE NOTE PROVIDER - NSDCMRMEDTOKEN_GEN_ALL_CORE_FT
aspirin 81 mg oral tablet: 1 tab(s) orally once a day   aspirin 81 mg oral tablet: 1 tab(s) orally once a day  atorvastatin 80 mg oral tablet: 1 tab(s) orally once a day (at bedtime)  carvedilol 3.125 mg oral tablet: 1 tab(s) orally every 12 hours

## 2024-10-15 NOTE — CONSULT NOTE ADULT - SUBJECTIVE AND OBJECTIVE BOX
Department of Cardiology                                                               Division of Interventional Cardiology                                                               [facility] / [address] [address]                                                                                 [phone]                                                                                                                               Interventional Cardiology Consult / Pre-Procedure Note    Recent/pertinent 24 hour events:    Subjective/ROS:   Denies CP, SOB, palpitations, N/V, fever/chills, abd pain, numbness/tingling/weakness, other c/o at this time.  ROS negative x 10 systems except as documented as above.    HPI: 48 y/o male with current cocaine abuse, questionable medication compliance  (inconsistently taking ASA 81mg, stopped his statin, losartan, metoprolol), h/o gastric Ca s/p chemo, dyslipidemia, h/o DVT in upper extremity, and known CAD with h/o NSTEMI in  and  s/p stents x7, who is now admitted with NSTEMI in setting of cocaine abuse and sustained VT requiring cardioversion.      Patient was BIBA to [place] ED yesterday c/o palpitations, chest pain and dizziness.  States about 20 minutes prior to arrival, developed palpitations and the other symptoms above, was also diaphoretic.  Went to [employer] [facility] where he works and 911 was called and they brought him to ED for evaluation. Endorses using cocaine 2 days ago, uses it a couple of times a month for the past few years.  No history of similar episode. At time of interview, patient had no acute complaints and denied chest pain, SOB, nausea/vomiting.    Presenting vitals: BP: 112/77, HR: 248, Temp: pending, RR: 24, SpO2: 95% on nonrebreather. Initial EKG on arrival: wide QRS tachycardia with PVCs, right ventricular hypertrophy, ventricular rate 266, QTc 421.  Treated with adenosine 6mg IV, ASA 325mg PO, etomidate 10mg IV, NS 1L IV bolus, lidocaine drip 1mg/min.  --> sinus rhythm with PVcs, old inferior and old anterolateral infarcts, ventricular rate 84 --> sinus rhythm with sinus arrhythmia, low voltage QRS, old inferior and old anterolateral infarcts, ventricular rate 68  Received in the ED: (14 Oct 2024 20:40)      PAST MEDICAL & SURGICAL HISTORY:  Cocaine abuse  HLD (hyperlipidemia)  CAD (coronary artery disease)  DVT of upper extremity (deep vein thrombosis)  Stomach cancer  NSTEMI (non-ST elevation myocardial infarction)  Stented coronary artery      FAMILY HISTORY:    Social History:  Lives: with parents  ADLs: independent  Diet:: no restrictions (14 Oct 2024 20:40)      MEDICATIONS  (STANDING):  aspirin enteric coated 81 milliGRAM(s) Oral daily  atorvastatin 80 milliGRAM(s) Oral at bedtime  heparin  Infusion.  Unit(s)/Hr (9.5 mL/Hr) IV Continuous <Continuous>  lidocaine   Infusion 1 mG/Min (7.5 mL/Hr) IV Continuous <Continuous>  pantoprazole  Injectable 40 milliGRAM(s) IV Push daily    MEDICATIONS  (PRN):  heparin   Injectable 4700 Unit(s) IV Push every 6 hours PRN For aPTT less than 40      Allergies: No Known Allergies        T(C): 36.8 (10-15-24 @ 04:49), Max: 37.2 (10-14-24 @ 23:35)  HR: 71 (10-15-24 @ 06:00) (55 - 269)  BP: 113/71 (10-15-24 @ 06:00) (94/65 - 126/77)  RR: 10 (10-15-24 @ 06:00) (8 - 37)  SpO2: 96% (10-15-24 @ 06:00) (94% - 97%)  Wt(kg): --  I&O's Summary    14 Oct 2024 07:01  -  15 Oct 2024 07:00  --------------------------------------------------------  IN: 245 mL / OUT: 500 mL / NET: -255 mL      Daily Height in cm: 170.18 (14 Oct 2024 22:15)    Daily Weight in k (15 Oct 2024 04:49)      TELEMETRY: 	      ECG:  	  ecg    LABS:	 	                        14.0   7.50  )-----------( 322      ( 15 Oct 2024 05:05 )             41.5     10-15    143  |  110[H]  |  15  ----------------------------<  98  3.8   |  25  |  0.61    Ca    8.9      15 Oct 2024 05:05  Phos  3.7     10-15  Mg     2.2     10-15    TPro  7.0  /  Alb  3.2[L]  /  TBili  0.4  /  DBili  x   /  AST  85[H]  /  ALT  101[H]  /  AlkPhos  41  10-15    Troponin I, High Sensitivity (10.15.24 @ 05:05): 7433.7 ng/L  Troponin I, High Sensitivity (10.15.24 @ 00:01): 7384.6 ng/L  Troponin I, High Sensitivity (10.14.24 @ 20:45): 2185.9 ng/L  Troponin I, High Sensitivity (10.14.24 @ 18:30): 34.1 ng/L      Physical Exam:  Constitutional: NAD  Neuro: A+O x 3, non-focal, speech clear and intact  HEENT: NC/AT, PERRL, EOMI, anicteric sclerae, oral mucosa pink and moist  Neck: supple, no JVD  CV: regular rate, regular rhythm, +S1S2, no murmurs or rub  Pulm/chest: lung sounds CTA and equal bilaterally, no accessory muscle use noted  Abd: soft, NT, ND  Ext: NGUYEN x 4, no C/C/E  Pulses: R radial 2+, bilat DP 2+  Skin: warm, well perfused  Psych: calm, appropriate affect                                                                                 Department of Cardiology                                                               Division of Interventional Cardiology                                                               [facility] / [address] [address]                                                                                 [phone]                                                                                                                               Interventional Cardiology Consult / Pre-Procedure Note      HPI: 50 y/o male with current cocaine abuse, current smoker, questionable medication compliance (inconsistently taking ASA 81mg, stopped his statin, losartan, metoprolol), h/o gastric Ca in remission following chemo last year, h/o DVT in right upper extremity last year treated with Eliquis (presumably provoked in setting of Ca diangosis; no longer taking AC), dyslipidemia, and known CAD with h/o NSTEMI in  and  s/p stents x7, who is now admitted with NSTEMI in setting of sustained VT requiring cardioversion and questionable cocaine abuse.      Patient was BIBA to [place] ED yesterday c/o palpitations, chest pain and dizziness.  States about 20 minutes prior to arrival, developed palpitations and the other symptoms above, was also diaphoretic.  Went to [employer] [facility] where he works and 911 was called and they brought him to ED for evaluation. Endorses using cocaine 2 days ago, uses it a couple of times a month for the past few years.  No history of similar episode. At time of interview, patient had no acute complaints and denied chest pain, SOB, nausea/vomiting.    Initial EKG on arrival: wide QRS tachycardia with PVCs, right ventricular hypertrophy, ventricular rate 266, QTc 421, not responsive to adenosine.  Due to hemodynamic instability patient ultimately cardioverted x1 to SR.  Repeat ECGs revealed sinus rhythm with PVcs, old inferior and old anterolateral infarcts, ventricular rate 84 --> sinus rhythm with sinus arrhythmia, low voltage QRS, old inferior and old anterolateral infarcts, ventricular rate 68.  Patient was started on a lidocaine gtt.  He R/I for NSTEMI and started on a heparin gtt.  Overnight, patient observed to have isolated ventricular triplet on telemetry otherwise no recurrent VT.  He remains chest pain free and HD stable.      Subjective/ROS:   Denies CP, SOB, palpitations, N/V, fever/chills, abd pain, numbness/tingling/weakness, other c/o at this time.  ROS negative x 10 systems except as documented as above.    PAST MEDICAL & SURGICAL HISTORY:  Cocaine abuse  HLD (hyperlipidemia)  CAD (coronary artery disease)  DVT of upper extremity (deep vein thrombosis)  Stomach cancer  NSTEMI (non-ST elevation myocardial infarction)  Stented coronary artery      FAMILY HISTORY:    Social History:  Lives: with parents  ADLs: independent  Diet: no restrictions (14 Oct 2024 20:40)  Current smoker  Current cocaine abuse within 48 hours  Social EtOH use.       MEDICATIONS  (STANDING):  aspirin enteric coated 81 milliGRAM(s) Oral daily  atorvastatin 80 milliGRAM(s) Oral at bedtime  heparin  Infusion.  Unit(s)/Hr (9.5 mL/Hr) IV Continuous <Continuous>  lidocaine   Infusion 1 mG/Min (7.5 mL/Hr) IV Continuous <Continuous>  pantoprazole  Injectable 40 milliGRAM(s) IV Push daily    MEDICATIONS  (PRN):  heparin   Injectable 4700 Unit(s) IV Push every 6 hours PRN For aPTT less than 40      Allergies: No Known Allergies        T(C): 36.8 (10-15-24 @ 04:49), Max: 37.2 (10-14-24 @ 23:35)  HR: 71 (10-15-24 @ 06:00) (55 - 269)  BP: 113/71 (10-15-24 @ 06:00) (94/65 - 126/77)  RR: 10 (10-15-24 @ 06:00) (8 - 37)  SpO2: 96% (10-15-24 @ 06:00) (94% - 97%)  Wt(kg): --  I&O's Summary    14 Oct 2024 07:01  -  15 Oct 2024 07:00  --------------------------------------------------------  IN: 245 mL / OUT: 500 mL / NET: -255 mL      Daily Height in cm: 170.18 (14 Oct 2024 22:15)    Daily Weight in k (15 Oct 2024 04:49)      TELEMETRY: 	      ECG:  	  ecg    LABS:	 	                        14.0   7.50  )-----------( 322      ( 15 Oct 2024 05:05 )             41.5     10-15    143  |  110[H]  |  15  ----------------------------<  98  3.8   |  25  |  0.61    Ca    8.9      15 Oct 2024 05:05  Phos  3.7     10-15  Mg     2.2     10-15    TPro  7.0  /  Alb  3.2[L]  /  TBili  0.4  /  DBili  x   /  AST  85[H]  /  ALT  101[H]  /  AlkPhos  41  10-15    Troponin I, High Sensitivity (10.15.24 @ 05:05): 7433.7 ng/L  Troponin I, High Sensitivity (10.15.24 @ 00:01): 7384.6 ng/L  Troponin I, High Sensitivity (10.14.24 @ 20:45): 2185.9 ng/L  Troponin I, High Sensitivity (10.14.24 @ 18:30): 34.1 ng/L      Physical Exam:  Constitutional: NAD  Neuro: A+O x 3, non-focal, speech clear and intact  HEENT: NC/AT, PERRL, EOMI, anicteric sclerae, oral mucosa pink and moist  Neck: supple, no JVD  CV: regular rate, regular rhythm, +S1S2, no murmurs or rub  Pulm/chest: lung sounds CTA and equal bilaterally, no accessory muscle use noted  Abd: soft, NT, ND  Ext: NGUYEN x 4, no C/C/E  Pulses: R radial 2+, bilat DP 2+  Skin: warm, well perfused  Psych: calm, appropriate affect

## 2024-10-15 NOTE — PROGRESS NOTE ADULT - SUBJECTIVE AND OBJECTIVE BOX
Patient is a 49y old  Male who presents with a chief complaint of Vtach (15 Oct 2024 11:24) with pmhx of NSTEMI, s/p 7 stents. Patient was found to have EF of 15% during cath 10/15.      INTERVAL HPI/OVERNIGHT EVENTS:   No overnight events   Afebrile, hemodynamically stable     ICU Vital Signs Last 24 Hrs  T(C): 37.1 (15 Oct 2024 07:00), Max: 37.2 (14 Oct 2024 23:35)  T(F): 98.7 (15 Oct 2024 07:00), Max: 98.9 (14 Oct 2024 23:35)  HR: 64 (15 Oct 2024 11:00) (55 - 269)  BP: 107/70 (15 Oct 2024 11:00) (94/65 - 126/77)  BP(mean): 84 (15 Oct 2024 11:00) (76 - 98)  RR: 15 (15 Oct 2024 11:00) (8 - 37)  SpO2: 96% (15 Oct 2024 11:00) (94% - 98%)    O2 Parameters below as of 15 Oct 2024 10:30  Patient On (Oxygen Delivery Method): room air          I&O's Summary    14 Oct 2024 07:01  -  15 Oct 2024 07:00  --------------------------------------------------------  IN: 245 mL / OUT: 500 mL / NET: -255 mL    15 Oct 2024 07:01  -  15 Oct 2024 11:41  --------------------------------------------------------  IN: 39.5 mL / OUT: 0 mL / NET: 39.5 mL          LABS:                        14.0   7.50  )-----------( 322      ( 15 Oct 2024 05:05 )             41.5     10-15    143  |  110[H]  |  15  ----------------------------<  98  3.8   |  25  |  0.61    Ca    8.9      15 Oct 2024 05:05  Phos  3.7     10-15  Mg     2.2     10-15    TPro  7.0  /  Alb  3.2[L]  /  TBili  0.4  /  DBili  x   /  AST  85[H]  /  ALT  101[H]  /  AlkPhos  41  10-15    PT/INR - ( 15 Oct 2024 05:05 )   PT: 13.4 sec;   INR: 1.15 ratio         PTT - ( 15 Oct 2024 05:05 )  PTT:76.9 sec  Urinalysis Basic - ( 15 Oct 2024 05:05 )    Color: x / Appearance: x / SG: x / pH: x  Gluc: 98 mg/dL / Ketone: x  / Bili: x / Urobili: x   Blood: x / Protein: x / Nitrite: x   Leuk Esterase: x / RBC: x / WBC x   Sq Epi: x / Non Sq Epi: x / Bacteria: x      CAPILLARY BLOOD GLUCOSE      POCT Blood Glucose.: 169 mg/dL (14 Oct 2024 18:23)        RADIOLOGY & ADDITIONAL TESTS:    Consultant(s) Notes Reviewed:  [x ] YES  [ ] NO    MEDICATIONS  (STANDING):  aspirin enteric coated 81 milliGRAM(s) Oral daily  atorvastatin 80 milliGRAM(s) Oral at bedtime  carvedilol 3.125 milliGRAM(s) Oral every 12 hours    MEDICATIONS  (PRN):      PHYSICAL EXAM:  GENERAL:   HEAD:  Atraumatic, Normocephalic  EYES: EOMI, PERRLA, conjunctiva and sclera clear  NERVOUS SYSTEM:  Alert & Awake.   CHEST/LUNG: B/L good air entry; No rales, rhonchi, or wheezing  HEART: S1S2 normal, no S3, Regular rate and rhythm  ABDOMEN: Soft, Nontender, Nondistended; Bowel sounds present  EXTREMITIES:  No c/c/e    Care Discussed with Consultants/Other Providers [ x] YES  [ ] NO Patient is a 49y old  Male who presents with a chief complaint of Vtach (15 Oct 2024 11:24) with pmhx of NSTEMI, s/p 7 stents.       INTERVAL HPI/OVERNIGHT EVENTS:   admitted for stable but symptomatic VT s/p CV x 1 in ED  remains in SR  started on lido gtt    ICU Vital Signs Last 24 Hrs  T(C): 37.1 (15 Oct 2024 07:00), Max: 37.2 (14 Oct 2024 23:35)  T(F): 98.7 (15 Oct 2024 07:00), Max: 98.9 (14 Oct 2024 23:35)  HR: 64 (15 Oct 2024 11:00) (55 - 269)  BP: 107/70 (15 Oct 2024 11:00) (94/65 - 126/77)  BP(mean): 84 (15 Oct 2024 11:00) (76 - 98)  RR: 15 (15 Oct 2024 11:00) (8 - 37)  SpO2: 96% (15 Oct 2024 11:00) (94% - 98%)    O2 Parameters below as of 15 Oct 2024 10:30  Patient On (Oxygen Delivery Method): room air          I&O's Summary    14 Oct 2024 07:01  -  15 Oct 2024 07:00  --------------------------------------------------------  IN: 245 mL / OUT: 500 mL / NET: -255 mL    15 Oct 2024 07:01  -  15 Oct 2024 11:41  --------------------------------------------------------  IN: 39.5 mL / OUT: 0 mL / NET: 39.5 mL          LABS:                        14.0   7.50  )-----------( 322      ( 15 Oct 2024 05:05 )             41.5     10-15    143  |  110[H]  |  15  ----------------------------<  98  3.8   |  25  |  0.61    Ca    8.9      15 Oct 2024 05:05  Phos  3.7     10-15  Mg     2.2     10-15    TPro  7.0  /  Alb  3.2[L]  /  TBili  0.4  /  DBili  x   /  AST  85[H]  /  ALT  101[H]  /  AlkPhos  41  10-15    PT/INR - ( 15 Oct 2024 05:05 )   PT: 13.4 sec;   INR: 1.15 ratio         PTT - ( 15 Oct 2024 05:05 )  PTT:76.9 sec  Urinalysis Basic - ( 15 Oct 2024 05:05 )    Color: x / Appearance: x / SG: x / pH: x  Gluc: 98 mg/dL / Ketone: x  / Bili: x / Urobili: x   Blood: x / Protein: x / Nitrite: x   Leuk Esterase: x / RBC: x / WBC x   Sq Epi: x / Non Sq Epi: x / Bacteria: x      CAPILLARY BLOOD GLUCOSE      POCT Blood Glucose.: 169 mg/dL (14 Oct 2024 18:23)        RADIOLOGY & ADDITIONAL TESTS:    Consultant(s) Notes Reviewed:  [x ] YES  [ ] NO    MEDICATIONS  (STANDING):  aspirin enteric coated 81 milliGRAM(s) Oral daily  atorvastatin 80 milliGRAM(s) Oral at bedtime  carvedilol 3.125 milliGRAM(s) Oral every 12 hours    MEDICATIONS  (PRN):      PHYSICAL EXAM:  GENERAL:   HEAD:  Atraumatic, Normocephalic  EYES: EOMI, PERRLA, conjunctiva and sclera clear  NERVOUS SYSTEM:  Alert & Awake.   CHEST/LUNG: B/L good air entry; No rales, rhonchi, or wheezing  HEART: S1S2 normal, no S3, Regular rate and rhythm  ABDOMEN: Soft, Nontender, Nondistended; Bowel sounds present  EXTREMITIES:  No c/c/e    Care Discussed with Consultants/Other Providers [ x] YES  [ ] NO

## 2024-10-15 NOTE — PROGRESS NOTE ADULT - SUBJECTIVE AND OBJECTIVE BOX
[facility] - Ignacio Santos, Tahir Brandon, Bruno Coleman  Office Number:  [phone]    Patient resting comfortably in bed in NAD.  Laying flat with no respiratory distress.  No complaints of chest pain, dyspnea, palpitations, PND, or orthopnea.    ROS: negative unless otherwise mentioned.    Telemetry: SR, NSVT    MEDICATIONS  (STANDING):  aspirin enteric coated 81 milliGRAM(s) Oral daily  atorvastatin 80 milliGRAM(s) Oral at bedtime  carvedilol 3.125 milliGRAM(s) Oral every 12 hours    MEDICATIONS  (PRN):      Allergies    No Known Allergies    Intolerances        Vital Signs Last 24 Hrs  T(C): 37.1 (15 Oct 2024 07:00), Max: 37.2 (14 Oct 2024 23:35)  T(F): 98.7 (15 Oct 2024 07:00), Max: 98.9 (14 Oct 2024 23:35)  HR: 64 (15 Oct 2024 11:00) (55 - 269)  BP: 107/70 (15 Oct 2024 11:00) (94/65 - 126/77)  BP(mean): 84 (15 Oct 2024 11:00) (76 - 98)  RR: 15 (15 Oct 2024 11:00) (8 - 37)  SpO2: 96% (15 Oct 2024 11:00) (94% - 98%)    Parameters below as of 15 Oct 2024 10:30  Patient On (Oxygen Delivery Method): room air        I&O's Summary    14 Oct 2024 07:01  -  15 Oct 2024 07:00  --------------------------------------------------------  IN: 245 mL / OUT: 500 mL / NET: -255 mL    15 Oct 2024 07:01  -  15 Oct 2024 11:25  --------------------------------------------------------  IN: 39.5 mL / OUT: 0 mL / NET: 39.5 mL        ON EXAM:    General: NAD, awake and alert, oriented x 3  HEENT: Mucous membranes are moist, anicteric  Lungs: Non-labored, breath sounds are clear bilaterally, No wheezing, rales or rhonchi  Cardiovascular: Regular, S1 and S2, no murmurs, rubs, or gallops  Gastrointestinal: Bowel Sounds present, soft, nontender.   Lymph: No peripheral edema. No lymphadenopathy.  Skin: No rashes or ulcers  Psych:  Mood & affect appropriate    LABS: All Labs Reviewed:                        14.0   7.50  )-----------( 322      ( 15 Oct 2024 05:05 )             41.5                         16.1   6.23  )-----------( 408      ( 14 Oct 2024 18:30 )             47.1     15 Oct 2024 05:05    143    |  110    |  15     ----------------------------<  98     3.8     |  25     |  0.61   15 Oct 2024 00:01    141    |  110    |  17     ----------------------------<  102    3.8     |  25     |  0.71   14 Oct 2024 18:30    143    |  107    |  19     ----------------------------<  179    4.1     |  24     |  1.20     Ca    8.9        15 Oct 2024 05:05  Ca    8.8        15 Oct 2024 00:01  Ca    9.5        14 Oct 2024 18:30  Phos  3.7       15 Oct 2024 05:05  Mg     2.2       15 Oct 2024 05:05  Mg     2.1       15 Oct 2024 00:01  Mg     2.3       14 Oct 2024 18:30    TPro  7.0    /  Alb  3.2    /  TBili  0.4    /  DBili  x      /  AST  85     /  ALT  101    /  AlkPhos  41     15 Oct 2024 05:05  TPro  8.2    /  Alb  3.7    /  TBili  0.5    /  DBili  x      /  AST  70     /  ALT  72     /  AlkPhos  49     14 Oct 2024 18:30    PT/INR - ( 15 Oct 2024 05:05 )   PT: 13.4 sec;   INR: 1.15 ratio         PTT - ( 15 Oct 2024 05:05 )  PTT:76.9 sec      Blood Culture:     [facility] 10/15/24:  Diagnostic Conclusions:   - Non-ischemic cardiomyopathy    - Moderate diffuse athetosclerosis    - Patentstents in LAD and D2 with mild ISR    - dLCX with 90% stenosis distal to bifurcation with OM1, distal vessel  is small and diffusely disease  - LVEDP 26, LVgram EF 15%   Recommendations:   - Given 40mg IV Lasix for elevated LVEDP   - TTE   - EP evaluation for possible ICD placement given presentation with VT   - Heart failure medication optimization    - Agressive risk factor management for CAD   - ASA 81mg   - Cessation of cocaine and tobacco use

## 2024-10-15 NOTE — PROGRESS NOTE ADULT - SUBJECTIVE AND OBJECTIVE BOX
Patient is a 49y old  Male who presents with a chief complaint of Vtach (15 Oct 2024 07:30)      Subjective:  INTERVAL HPI/OVERNIGHT EVENTS: Patient seen and examined at bedside. Overnight admission note noted. Patient has no complaints at this time, scheduled for cardiac catheterization today. Pt denies chest pain, sob, n/v/d    MEDICATIONS  (STANDING):  aspirin enteric coated 81 milliGRAM(s) Oral daily  atorvastatin 80 milliGRAM(s) Oral at bedtime  heparin  Infusion.  Unit(s)/Hr (9.5 mL/Hr) IV Continuous <Continuous>  lidocaine   Infusion 1 mG/Min (7.5 mL/Hr) IV Continuous <Continuous>  pantoprazole  Injectable 40 milliGRAM(s) IV Push daily    MEDICATIONS  (PRN):  heparin   Injectable 4700 Unit(s) IV Push every 6 hours PRN For aPTT less than 40      Allergies    No Known Allergies    Intolerances        REVIEW OF SYSTEMS:  CONSTITUTIONAL: No fever or chills  HEENT:  No headache, no sore throat  RESPIRATORY: No cough, wheezing, or shortness of breath  CARDIOVASCULAR: No chest pain, palpitations  GASTROINTESTINAL: No abd pain, nausea, vomiting, or diarrhea  GENITOURINARY: No dysuria, frequency, or hematuria  NEUROLOGICAL: no focal weakness or dizziness  MUSCULOSKELETAL: no myalgias     Objective:  Vital Signs Last 24 Hrs  T(C): 36.8 (15 Oct 2024 04:49), Max: 37.2 (14 Oct 2024 23:35)  T(F): 98.3 (15 Oct 2024 04:49), Max: 98.9 (14 Oct 2024 23:35)  HR: 71 (15 Oct 2024 06:00) (55 - 269)  BP: 113/71 (15 Oct 2024 06:00) (94/65 - 126/77)  BP(mean): 88 (15 Oct 2024 06:00) (76 - 95)  RR: 10 (15 Oct 2024 06:00) (8 - 37)  SpO2: 96% (15 Oct 2024 06:00) (94% - 97%)    Parameters below as of 14 Oct 2024 21:10  Patient On (Oxygen Delivery Method): room air        GENERAL: NAD, lying in bed comfortably  CHEST/LUNG: Clear to auscultation bilaterally; No rales, rhonchi, wheezing, or rubs. Unlabored respirations  HEART: Regular rate and rhythm; No murmurs, rubs, or gallops  ABDOMEN: Bowel sounds present; Soft, Nontender, Nondistended. No hepatomegaly  EXTREMITIES:  2+ Peripheral Pulses, brisk capillary refill. No clubbing, cyanosis, or edema  NERVOUS SYSTEM:  Alert & Oriented X3, speech clear. No deficits   MSK: FROM all 4 extremities, full and equal strength  SKIN: No rashes or lesions    LABS:                        14.0   7.50  )-----------( 322      ( 15 Oct 2024 05:05 )             41.5     CBC Full  -  ( 15 Oct 2024 05:05 )  WBC Count : 7.50 K/uL  Hemoglobin : 14.0 g/dL  Hematocrit : 41.5 %  Platelet Count - Automated : 322 K/uL  Mean Cell Volume : 93.7 fl  Mean Cell Hemoglobin : 31.6 pg  Mean Cell Hemoglobin Concentration : 33.7 gm/dL  Auto Neutrophil # : x  Auto Lymphocyte # : x  Auto Monocyte # : x  Auto Eosinophil # : x  Auto Basophil # : x  Auto Neutrophil % : x  Auto Lymphocyte % : x  Auto Monocyte % : x  Auto Eosinophil % : x  Auto Basophil % : x    15 Oct 2024 05:05    143    |  110    |  15     ----------------------------<  98     3.8     |  25     |  0.61     Ca    8.9        15 Oct 2024 05:05  Phos  3.7       15 Oct 2024 05:05  Mg     2.2       15 Oct 2024 05:05    TPro  7.0    /  Alb  3.2    /  TBili  0.4    /  DBili  x      /  AST  85     /  ALT  101    /  AlkPhos  41     15 Oct 2024 05:05    PT/INR - ( 15 Oct 2024 05:05 )   PT: 13.4 sec;   INR: 1.15 ratio         PTT - ( 15 Oct 2024 05:05 )  PTT:76.9 sec  Urinalysis Basic - ( 15 Oct 2024 05:05 )    Color: x / Appearance: x / SG: x / pH: x  Gluc: 98 mg/dL / Ketone: x  / Bili: x / Urobili: x   Blood: x / Protein: x / Nitrite: x   Leuk Esterase: x / RBC: x / WBC x   Sq Epi: x / Non Sq Epi: x / Bacteria: x      CAPILLARY BLOOD GLUCOSE      POCT Blood Glucose.: 169 mg/dL (14 Oct 2024 18:23)          RADIOLOGY & ADDITIONAL TESTS:      Personally reviewed.     Consultant(s) Notes Reviewed:  [x] YES  [ ] NO

## 2024-10-15 NOTE — PROGRESS NOTE ADULT - PROBLEM SELECTOR PLAN 2
- Chronic  - Pt formerly took statin, losartan, metoprolol, now takes asa 81mg inconsistently  - Avoid beta blocker in setting of cocaine use  - S/p  mg, then start 81 mg daily  - Plan per ICU - Chronic  - Pt formerly took statin, losartan, metoprolol, now takes asa 81mg inconsistently  - Avoid beta blocker in setting of cocaine use  - Cont 81 mg daily and statin  - Plan per ICU

## 2024-10-15 NOTE — DISCHARGE NOTE PROVIDER - NSDCCPCAREPLAN_GEN_ALL_CORE_FT
PRINCIPAL DISCHARGE DIAGNOSIS  Diagnosis: Ventricular tachycardia  Assessment and Plan of Treatment:      PRINCIPAL DISCHARGE DIAGNOSIS  Diagnosis: Ventricular tachycardia  Assessment and Plan of Treatment: You were found to have a heart arrythemia incompatible with life. In the ED, you were shocked and palced on medication to prevent worsening of your heart arythemia and possible occlusions in your heart vessels.  You were then taken to the cardiac cath lab where you were found to have 90% occlusion in the LCX but no stent was placed at this time. You were also found to have significant Heart failure, with your heart only pumping with an ejection fraction of 15%. This means you heart function is extremly reduced and needs to be managed with medications to prevent worsening of heart function, fluid overload and potentially worse outcomes.    You were then prepared for transfer for an ICD placement at [facility] and more aggressive monitoring after ICD palcement.     PRINCIPAL DISCHARGE DIAGNOSIS  Diagnosis: Ventricular tachycardia  Assessment and Plan of Treatment: You were found to have a heart arrythmia incompatible with life. In the ED, you were shocked and palced on medication to prevent worsening of your heart arythemia and possible occlusions in your heart vessels.  You were then taken to the cardiac cath lab where you were found to have 90% occlusion in the LCX but no stent was placed at this time. You were also found to have significant Heart failure, with your heart only pumping with an ejection fraction of 15%. This means you heart function is extremly reduced and needs to be managed with medications to prevent worsening of heart function, fluid overload and potentially worse outcomes.    You were then prepared for transfer for an ICD placement at [facility] and more aggressive monitoring after ICD palcement.      SECONDARY DISCHARGE DIAGNOSES  Diagnosis: HLD (hyperlipidemia)  Assessment and Plan of Treatment:     Diagnosis: CHF (congestive heart failure)  Assessment and Plan of Treatment:     Diagnosis: Cocaine use disorder  Assessment and Plan of Treatment:     Diagnosis: CAD (coronary artery disease)  Assessment and Plan of Treatment:      PRINCIPAL DISCHARGE DIAGNOSIS  Diagnosis: Ventricular tachycardia  Assessment and Plan of Treatment: You were found to have a heart arrythmia incompatible with life. In the ED, you were shocked and palced on medication to prevent worsening of your heart arythemia and possible occlusions in your heart vessels.  You were then taken to the cardiac cath lab where you were found to have 90% occlusion in the LCX but no stent was placed at this time. You were also found to have significant Heart failure, with your heart only pumping with an ejection fraction of 15%. This means you heart function is extremly reduced and needs to be managed with medications to prevent worsening of heart function, fluid overload and potentially worse outcomes. You were also given lidocaine via IV temporarily for your heart rhythm.  You were then prepared for transfer for an ICD placement at [facility] and more aggressive monitoring after ICD palcement.      SECONDARY DISCHARGE DIAGNOSES  Diagnosis: CAD (coronary artery disease)  Assessment and Plan of Treatment: You have a history of coronary artery disease.   Please CONTINUE your home medications as prescribed, aspirin and statin.      Diagnosis: HLD (hyperlipidemia)  Assessment and Plan of Treatment:     Diagnosis: CHF (congestive heart failure)  Assessment and Plan of Treatment: You had a cardiac cath show an ejection fraction of 15% and your dLCX artery with 90% stenosis distal to bifurcation.  Please CONTINUE coreg 3.125 daily.  Please FOLLOW UP with a cardiologist outpatient.  Please consider discussing initiating an ACE inhibitor or ARB with your outpatient doctor for further heart failure management. Due to your soft blood pressure we did not start it here.    Diagnosis: Cocaine use disorder  Assessment and Plan of Treatment: You tested positive for cocaine use on urine toxicology while here.  Please STOP using cocaine.

## 2024-10-15 NOTE — CONSULT NOTE ADULT - ASSESSMENT
49 year old male who presented with symptomatic VTach and is now post cardioversion/defibrillation [id number] admitted to ICU for observation given his high risk of decompensation    Plan   -start lidocaine infusion 1mg/hr  - aspirin  325 now in ER  then 81mg daily  - Lipitor 80 mg daily   - trend troponin and should it rise will start on heparin gtt for acs protocol  -trend renal function   -monitor electrolytes specifically potassium ( goal >4) magnesium (goal>2)   -blood pressure control   - will avoid beta blocker usage in the setting of cocaine use  - order echocardiogram  -NPO past midnight   - likely plan for cardiac cath in the AM  -DVT and GI prophylaxis     Critical Care time: 35 mins assessing presenting problems of acute illness that poses high probability of life threatening deterioration or end organ damage/dysfunction.  Medical decision making inculding Initiating plan of care, reviewing data, reviewing radiology,direct patient bedside evaluation and interpretation of vital signs, any necessary ventilator management , discusion with multidisciplinary team, discussing goals of care with patient/family, all non inclusive of procedures, COVID 19 specific considerations and therapeutic  options based on the available and rapidly changing literature. Date of entry of this note is equal to the date of services rendered     
49 year old male with CAD (reports 7 stents and MIs in past), here with dizziness/palpitations/dyspnea/tachycardia.  On arrival, appeared diaphoretic, with nausea/vomiting, and tachycardic. Found to be in a WCT at 250+ bpm, likely VT.    - given hemodynamic instability, decision made for cardioversion/defibrillation, given 200 J and converted to SR  - EKG now with sr and anterior infarct, with st abn ricarda laterally, without prior ekg to compare  - reports CAD with 7 stents in past, and only taking ASA  - asa 325, then 81 daily  - start statin (lipitor 80)  - 1st troponin is negative, and need to trend troponin. If rises, would start on heparin gtt with acs protocol  - will need to be admitted and watched in ICU overnight  - favor starting lidocaine gtt at 1 mg/hr  - will avoid bb in the setting of cocaine use  - echocardiogram  - plan for cardiac cath in the AM  - trend creatinine and electrolytes. Keep K>4, mg>2  - very high risk of decompensation.      
50 y/o male with current cocaine abuse, current smoker, questionable medication compliance (inconsistently taking ASA 81mg, stopped his statin, losartan, metoprolol), h/o gastric Ca in remission following chemo last year, h/o DVT in right upper extremity last year treated with Eliquis (presumably provoked in setting of Ca diangosis; no longer taking AC), dyslipidemia, and known CAD with h/o NSTEMI in 2001 and 2007 s/p stents x7, who is now admitted with NSTEMI in setting of sustained VT requiring cardioversion and questionable cocaine abuse.     Plan:     - Urgent cardiac catheterization this morning  - IV hydration pre/intra-procedure per protocol  - Hold heparin gtt on call to lab  - Continue ASA  - Avoid BB use given recent cocaine use (within past 2 days)  - Further recommendations to follow.

## 2024-10-15 NOTE — CHART NOTE - NSCHARTNOTEFT_GEN_A_CORE
Post Diagnostic Cardiac Catheterization Chart Note      Prelim cath report:   LHC via RRA  mLAD 30% ISR  D2 30% ISR  dLCx 90%.   LVEF 15%  LVEDP 26 mmHg  full/official report to follow      Patient without complaints. Denies CP, SOB, palpitations, N/V, fever/chills, abd pain, numbness/tingling/weakness, other c/o at this time.    A+O x 3, neurologically intact  RRA access site stable (clean, dry, intact, without bleeding, heat, erythema, or hematoma). Radial band in place.  RUE motor, neuro, circ intact.  Hemodynamically stable, neurologically intact, VS stable, afebrile    A/P: s/p diagnostic LHC  Extent of CAD out of proportion to LVEF: medical mgmt  post cath access site precautions reviewed with pt who verbalized good understanding  hold metformin x 48 hours  okay to resume eliquis on ***  d/c home once post cath recovery completed / discharge criteria met and wrist / hemodynamics remain stable (with radial band removed).   see discharge for instructions/plan Post Diagnostic Cardiac Catheterization Chart Note      Prelim cath report:   LHC via RRA  mLAD 30% ISR  D2 30% ISR  dLCx 90%   LVEF 15%  LVEDP 26 mmHg.  Given furosemide 40 mg IVP x1 intra-procedure.   full/official report to follow      Patient without complaints. Denies CP, SOB, palpitations, N/V, fever/chills, abd pain, numbness/tingling/weakness, other c/o at this time.    A+O x 3, neurologically intact  RRA access site stable (clean, dry, intact, without bleeding, heat, erythema, or hematoma). Radial band in place.  RUE motor, neuro, circ intact.  Hemodynamically stable, neurologically intact, VS stable, afebrile    A/P: s/p diagnostic LHC  - Extent of CAD out of proportion to LVEF.  Continue medical mgmt.   - Post cath access site precautions reviewed with pt who verbalized good understanding  - Discontinue heparin gtt.  Continue ASA 81 mg daily  - No post hydration given elevated LVEDP.   - EP evaluation for VT.   - GDMT deferred to clinical cardiology team.  Remainder of plan per primary team/non-interventional cardiology

## 2024-10-15 NOTE — CHART NOTE - NSCHARTNOTEFT_GEN_A_CORE
Electrophysiology Consult    Chart reviewed  Patient with h/o CAD, MI and stents.  Known cardiomyopathy with LVEF 33% in June, 2023.  Outpatient cardiologist is Dr. Quintanilla; [address] [place] [address] cardiology.  Seen by Dr. Flowers for heart failure.   Patient self discontinued all medications because he was feeling better.   Now A/W NSTEMI in setting of sustained monomorphic VT requiring cardioversion.   Pt reports current cocaine abuse within past 2 days.   LVEF is 15% by LV gram  Low suspicion for underlying ischemia; Extent of CAD by cardiac catheterization is out of proportion to LVEF.    QTc 394 ms on baseline ECG.    No recurrent VT on lidocaine gtt    Plan:   - Risk of recurrent ventricular arrhthymias and risk for sudden cardiac death was explained at length with patient.  ICD was recommended for primary prevention of SCD; risk, benefits and alternatives were discussed with patient.  Patient is declining ICD implant at this time and is willing to accept risk including but not limited to death.    - Discontinue lidocaine gtt.  Avoid selective beta blocker given current cocaine abuse.  Consider starting carvedilol or labetalol.    - Will sign off from EP perspective.  Please re-consult as necessary.      Above plan discussed with EP attending, Dr. Julio.

## 2024-10-15 NOTE — PROGRESS NOTE ADULT - PROBLEM SELECTOR PLAN 1
- Pt with PMHx NSTEMI s/p 7 stents, admitted for symptomatic VTach, s/p defibrillation/cardioversion 200J - likely ischemic heart disease in the setting of cocaine abuse and CAD  - Trop wnl x1, 2nd trop 2185  - EKG on arrival: wide QRS tachycardia with PVCs, right ventricular hypertrophy, ventricular rate 266, QTc 421 --> sinus rhythm with PVcs, old inferior and old anterolateral infarcts, ventricular rate 84 --> sinus rhythm with sinus arrhythmia, low voltage QRS, old inferior and old anterolateral infarcts, ventricular rate 68  - S/p adenosine 6mg IV, ASA 325mg PO, etomidate 10mg IV, NS 1L IV bolus, lidocaine drip 1mg/min  - Admit to ICU for further management, at risk for decompensation  - Per cardio, start heparin gtt and acs protocol given 2nd trop elevated  - Continue lidocaine gtt  - Plan for cath this AM  - Avoid beta blocker in setting of cocaine use  - Pantoprazole for GI ppx  - F/u TTE results  - Trend creatinine and electrolytes. Keep K>4, mg>2  - Very high risk of decompensation  - Cardio Dr. Carr consulted, recs appreciated  - Plan per ICU team - Pt with PMHx NSTEMI s/p 7 stents, admitted for symptomatic VTach, s/p defibrillation/cardioversion 200J - likely ischemic heart disease in the setting of cocaine abuse and CAD  - Trop wnl x1, 2nd trop 2185  - EKG on arrival: wide QRS tachycardia with PVCs, right ventricular hypertrophy, ventricular rate 266, QTc 421 --> sinus rhythm with PVcs, old inferior and old anterolateral infarcts, ventricular rate 84 --> sinus rhythm with sinus arrhythmia, low voltage QRS, old inferior and old anterolateral infarcts, ventricular rate 68  - S/p adenosine 6mg IV, ASA 325mg PO, etomidate 10mg IV, NS 1L IV bolus, lidocaine drip 1mg/min  - Admit to ICU for further management, at risk for decompensation  - Per cardio, start heparin gtt and acs protocol given 2nd trop elevated  - Continue lidocaine gtt  - LHC performed --> mod diffuse atherosclerosis, dLCX 90% 90% stenosis, EP eval for ICD placement  - Pt declines ICD and understands risks  - Avoid beta blocker in setting of cocaine use  - Pantoprazole for GI ppx  - F/u TTE results  - Trend creatinine and electrolytes. Keep K>4, mg>2  - Very high risk of decompensation  - Cardio Dr. Carr consulted, recs appreciated  - Plan per ICU team - Pt with PMHx NSTEMI s/p 7 stents, admitted for symptomatic VTach, s/p defibrillation/cardioversion 200J - likely ischemic heart disease in the setting of cocaine abuse and CAD  - Trop wnl x1, 2nd trop 2185  - EKG on arrival: wide QRS tachycardia with PVCs, right ventricular hypertrophy, ventricular rate 266, QTc 421 --> sinus rhythm with PVcs, old inferior and old anterolateral infarcts, ventricular rate 84 --> sinus rhythm with sinus arrhythmia, low voltage QRS, old inferior and old anterolateral infarcts, ventricular rate 68  - S/p adenosine 6mg IV, ASA 325mg PO, etomidate 10mg IV, NS 1L IV bolus, lidocaine drip 1mg/min  - Admitted to ICU for further management, at risk for decompensation  - Heparin gtt d/c, cont asa, statin  - D/c lidocaine infusion and starting coreg 3.125 per Dr Carr Cardio  - Starting ACE/ARB in AM   - LHC performed --> EF 15% LVgram, mod diffuse atherosclerosis, dLCX 90% 90% stenosis, EP eval for ICD placement  - Pt declines ICD and understands risks  - Avoid beta blocker in setting of cocaine use  - Pantoprazole for GI ppx  - F/u TTE results  - Trend creatinine and electrolytes. Keep K>4, mg>2  - Very high risk of decompensation  - Cardio Dr. Carr consulted, recs appreciated  - Plan per ICU team

## 2024-10-15 NOTE — DISCHARGE NOTE PROVIDER - PROVIDER TOKENS
FREE:[LAST:[Janie],PHONE:[(   )    -],FAX:[(   )    -],ADDRESS:[[address] [place] [id number]]]

## 2024-10-15 NOTE — DISCHARGE NOTE PROVIDER - CARE PROVIDER_API CALL
Janie,   [address] [facility] [address]  Phone: (   )    -  Fax: (   )    -  Follow Up Time:

## 2024-10-16 DIAGNOSIS — I50.9 HEART FAILURE, UNSPECIFIED: ICD-10-CM

## 2024-10-16 LAB
A1C WITH ESTIMATED AVERAGE GLUCOSE RESULT: 5.8 % — HIGH (ref 4–5.6)
ALBUMIN SERPL ELPH-MCNC: 3.2 G/DL — LOW (ref 3.3–5)
ALP SERPL-CCNC: 41 U/L — SIGNIFICANT CHANGE UP (ref 40–120)
ALT FLD-CCNC: 77 U/L — SIGNIFICANT CHANGE UP (ref 12–78)
ANION GAP SERPL CALC-SCNC: 5 MMOL/L — SIGNIFICANT CHANGE UP (ref 5–17)
AST SERPL-CCNC: 47 U/L — HIGH (ref 15–37)
BASOPHILS # BLD AUTO: 0.06 K/UL — SIGNIFICANT CHANGE UP (ref 0–0.2)
BASOPHILS NFR BLD AUTO: 1 % — SIGNIFICANT CHANGE UP (ref 0–2)
BILIRUB SERPL-MCNC: 0.4 MG/DL — SIGNIFICANT CHANGE UP (ref 0.2–1.2)
BUN SERPL-MCNC: 18 MG/DL — SIGNIFICANT CHANGE UP (ref 7–23)
CALCIUM SERPL-MCNC: 9.3 MG/DL — SIGNIFICANT CHANGE UP (ref 8.5–10.1)
CHLORIDE SERPL-SCNC: 108 MMOL/L — SIGNIFICANT CHANGE UP (ref 96–108)
CHOLEST SERPL-MCNC: 133 MG/DL — SIGNIFICANT CHANGE UP
CO2 SERPL-SCNC: 26 MMOL/L — SIGNIFICANT CHANGE UP (ref 22–31)
CREAT SERPL-MCNC: 0.66 MG/DL — SIGNIFICANT CHANGE UP (ref 0.5–1.3)
EGFR: 116 ML/MIN/1.73M2 — SIGNIFICANT CHANGE UP
EOSINOPHIL # BLD AUTO: 0.2 K/UL — SIGNIFICANT CHANGE UP (ref 0–0.5)
EOSINOPHIL NFR BLD AUTO: 3.4 % — SIGNIFICANT CHANGE UP (ref 0–6)
ESTIMATED AVERAGE GLUCOSE: 120 MG/DL — HIGH (ref 68–114)
GLUCOSE SERPL-MCNC: 103 MG/DL — HIGH (ref 70–99)
HCT VFR BLD CALC: 44.2 % — SIGNIFICANT CHANGE UP (ref 39–50)
HDLC SERPL-MCNC: 41 MG/DL — SIGNIFICANT CHANGE UP
HGB BLD-MCNC: 14.7 G/DL — SIGNIFICANT CHANGE UP (ref 13–17)
IMM GRANULOCYTES NFR BLD AUTO: 0.2 % — SIGNIFICANT CHANGE UP (ref 0–0.9)
LIPID PNL WITH DIRECT LDL SERPL: 77 MG/DL — SIGNIFICANT CHANGE UP
LYMPHOCYTES # BLD AUTO: 1.98 K/UL — SIGNIFICANT CHANGE UP (ref 1–3.3)
LYMPHOCYTES # BLD AUTO: 33.3 % — SIGNIFICANT CHANGE UP (ref 13–44)
MAGNESIUM SERPL-MCNC: 2.1 MG/DL — SIGNIFICANT CHANGE UP (ref 1.6–2.6)
MCHC RBC-ENTMCNC: 31.3 PG — SIGNIFICANT CHANGE UP (ref 27–34)
MCHC RBC-ENTMCNC: 33.3 GM/DL — SIGNIFICANT CHANGE UP (ref 32–36)
MCV RBC AUTO: 94 FL — SIGNIFICANT CHANGE UP (ref 80–100)
MONOCYTES # BLD AUTO: 0.66 K/UL — SIGNIFICANT CHANGE UP (ref 0–0.9)
MONOCYTES NFR BLD AUTO: 11.1 % — SIGNIFICANT CHANGE UP (ref 2–14)
NEUTROPHILS # BLD AUTO: 3.03 K/UL — SIGNIFICANT CHANGE UP (ref 1.8–7.4)
NEUTROPHILS NFR BLD AUTO: 51 % — SIGNIFICANT CHANGE UP (ref 43–77)
NON HDL CHOLESTEROL: 92 MG/DL — SIGNIFICANT CHANGE UP
NRBC # BLD: 0 /100 WBCS — SIGNIFICANT CHANGE UP (ref 0–0)
PHOSPHATE SERPL-MCNC: 3.3 MG/DL — SIGNIFICANT CHANGE UP (ref 2.5–4.5)
PLATELET # BLD AUTO: 328 K/UL — SIGNIFICANT CHANGE UP (ref 150–400)
POTASSIUM SERPL-MCNC: 3.9 MMOL/L — SIGNIFICANT CHANGE UP (ref 3.5–5.3)
POTASSIUM SERPL-SCNC: 3.9 MMOL/L — SIGNIFICANT CHANGE UP (ref 3.5–5.3)
PROT SERPL-MCNC: 7.4 G/DL — SIGNIFICANT CHANGE UP (ref 6–8.3)
RBC # BLD: 4.7 M/UL — SIGNIFICANT CHANGE UP (ref 4.2–5.8)
RBC # FLD: 13 % — SIGNIFICANT CHANGE UP (ref 10.3–14.5)
SODIUM SERPL-SCNC: 139 MMOL/L — SIGNIFICANT CHANGE UP (ref 135–145)
TRIGL SERPL-MCNC: 75 MG/DL — SIGNIFICANT CHANGE UP
TROPONIN I, HIGH SENSITIVITY RESULT: 1826.5 NG/L — HIGH
WBC # BLD: 5.94 K/UL — SIGNIFICANT CHANGE UP (ref 3.8–10.5)
WBC # FLD AUTO: 5.94 K/UL — SIGNIFICANT CHANGE UP (ref 3.8–10.5)

## 2024-10-16 PROCEDURE — 82962 GLUCOSE BLOOD TEST: CPT

## 2024-10-16 PROCEDURE — 96375 TX/PRO/DX INJ NEW DRUG ADDON: CPT | Mod: XU

## 2024-10-16 PROCEDURE — 99233 SBSQ HOSP IP/OBS HIGH 50: CPT | Mod: GC

## 2024-10-16 PROCEDURE — C1769: CPT

## 2024-10-16 PROCEDURE — 87640 STAPH A DNA AMP PROBE: CPT

## 2024-10-16 PROCEDURE — 96374 THER/PROPH/DIAG INJ IV PUSH: CPT | Mod: XU

## 2024-10-16 PROCEDURE — 93306 TTE W/DOPPLER COMPLETE: CPT | Mod: 26

## 2024-10-16 PROCEDURE — 92960 CARDIOVERSION ELECTRIC EXT: CPT

## 2024-10-16 PROCEDURE — C1894: CPT

## 2024-10-16 PROCEDURE — 84100 ASSAY OF PHOSPHORUS: CPT

## 2024-10-16 PROCEDURE — 80061 LIPID PANEL: CPT

## 2024-10-16 PROCEDURE — 80053 COMPREHEN METABOLIC PANEL: CPT

## 2024-10-16 PROCEDURE — 80048 BASIC METABOLIC PNL TOTAL CA: CPT

## 2024-10-16 PROCEDURE — 85610 PROTHROMBIN TIME: CPT

## 2024-10-16 PROCEDURE — 99285 EMERGENCY DEPT VISIT HI MDM: CPT

## 2024-10-16 PROCEDURE — 93005 ELECTROCARDIOGRAM TRACING: CPT

## 2024-10-16 PROCEDURE — 99232 SBSQ HOSP IP/OBS MODERATE 35: CPT | Mod: GC

## 2024-10-16 PROCEDURE — 85025 COMPLETE CBC W/AUTO DIFF WBC: CPT

## 2024-10-16 PROCEDURE — 36415 COLL VENOUS BLD VENIPUNCTURE: CPT

## 2024-10-16 PROCEDURE — 99291 CRITICAL CARE FIRST HOUR: CPT | Mod: 25

## 2024-10-16 PROCEDURE — 71045 X-RAY EXAM CHEST 1 VIEW: CPT

## 2024-10-16 PROCEDURE — 99152 MOD SED SAME PHYS/QHP 5/>YRS: CPT

## 2024-10-16 PROCEDURE — 99291 CRITICAL CARE FIRST HOUR: CPT

## 2024-10-16 PROCEDURE — 84484 ASSAY OF TROPONIN QUANT: CPT

## 2024-10-16 PROCEDURE — 93458 L HRT ARTERY/VENTRICLE ANGIO: CPT

## 2024-10-16 PROCEDURE — 85027 COMPLETE CBC AUTOMATED: CPT

## 2024-10-16 PROCEDURE — 87641 MR-STAPH DNA AMP PROBE: CPT

## 2024-10-16 PROCEDURE — 93306 TTE W/DOPPLER COMPLETE: CPT

## 2024-10-16 PROCEDURE — C1887: CPT

## 2024-10-16 PROCEDURE — 83036 HEMOGLOBIN GLYCOSYLATED A1C: CPT

## 2024-10-16 PROCEDURE — 85730 THROMBOPLASTIN TIME PARTIAL: CPT

## 2024-10-16 PROCEDURE — 80307 DRUG TEST PRSMV CHEM ANLYZR: CPT

## 2024-10-16 PROCEDURE — 83735 ASSAY OF MAGNESIUM: CPT

## 2024-10-16 RX ORDER — ATORVASTATIN CALCIUM 10 MG/1
1 TABLET, FILM COATED ORAL
Qty: 30 | Refills: 0
Start: 2024-10-16 | End: 2024-11-14

## 2024-10-16 RX ORDER — FUROSEMIDE 10 MG/ML
40 INJECTION INTRAVENOUS ONCE
Refills: 0 | Status: COMPLETED | OUTPATIENT
Start: 2024-10-16 | End: 2024-10-16

## 2024-10-16 RX ORDER — ENOXAPARIN SODIUM 150 MG/ML
40 INJECTION SUBCUTANEOUS
Qty: 1 | Refills: 0
Start: 2024-10-16

## 2024-10-16 RX ORDER — CARVEDILOL 3.125 MG
1 TABLET ORAL
Qty: 0 | Refills: 0 | DISCHARGE
Start: 2024-10-16

## 2024-10-16 RX ORDER — ATORVASTATIN CALCIUM 10 MG/1
1 TABLET, FILM COATED ORAL
Qty: 0 | Refills: 0 | DISCHARGE
Start: 2024-10-16

## 2024-10-16 RX ORDER — ENOXAPARIN SODIUM 150 MG/ML
40 INJECTION SUBCUTANEOUS EVERY 24 HOURS
Refills: 0 | Status: DISCONTINUED | OUTPATIENT
Start: 2024-10-16 | End: 2024-10-16

## 2024-10-16 RX ORDER — ASPIRIN 325 MG
1 TABLET ORAL
Qty: 30 | Refills: 0
Start: 2024-10-16 | End: 2024-11-14

## 2024-10-16 RX ORDER — CARVEDILOL 3.125 MG
1 TABLET ORAL
Qty: 60 | Refills: 0
Start: 2024-10-16 | End: 2024-11-14

## 2024-10-16 RX ADMIN — Medication 3.12 MILLIGRAM(S): at 17:18

## 2024-10-16 RX ADMIN — FUROSEMIDE 40 MILLIGRAM(S): 10 INJECTION INTRAVENOUS at 14:59

## 2024-10-16 RX ADMIN — Medication 3.12 MILLIGRAM(S): at 05:25

## 2024-10-16 RX ADMIN — Medication 81 MILLIGRAM(S): at 12:04

## 2024-10-16 NOTE — PROGRESS NOTE ADULT - ATTENDING COMMENTS
49 year old male with Hx CAD, HFrEF who presented with symptomatic VTach s/p CV x 1 to SR.  S/p diagnostic LHC showing moderate diffuse atherosclerosis and found to have EF 15%, out of proportion to CAD.    --mentating well  --CAD. continue ASA, statin  new worsened HFrEF, continue coreg, lasix today  sustained monomorphic symptomatic VT, now in SR, continue coreg  add ACEI when BP allows  --stable respiratory status  --normal renal function  --PO diet  --no infectious concerns  --plan for transfer to [facility] for AICD implantation, pt agreeable  stable for transfer to [facility]  discussed with pt
49 year old male with Hx CAD, HFrEF who presented with symptomatic VTach s/p CV x 1 to SR.  S/p diagnostic LHC showing moderate diffuse atherosclerosis and found to have EF 15%, out of proportion to CAD.    --mentating well  --CAD. continue ASA, statin  new worsened HFrEF, s/p lasix in cath lab, start coreg, check echo  sustained monomorphic symptomatic VT, continue coreg, d/c lidocaine  --stable respiratory status  --RUKHSANA resolved  --PO diet  --no infectous concerns  --plan for transfer to [facility] for AICD implantation, pt agreeable  stable for transfer to [facility]  discussed with pt and family
49M with [facility] NSTEMI (2001, 2017) s/p 7 stents, coronary artery disease, HLD, prior upper extremity DVT, recent stomach cancer s/p chemo, cocaine use, who presented with chest pain, palpitations, diaphoresis. Found to be in wide complex ventricular tachycardia, defibrillated x1 with return to sinus rhythm with some sinus arrhythmia. Admitted to ICU for further management. Plan for transfer to [facility] for further management. Continue ASA, Lipitor. IV Lasix ordered x 1 prior to transfer. Management per ICU.
49 year old [facility] NSTEMI (2001, 2017) s/p 7 stents, coronary artery disease, HLD, prior upper extremity DVT, recent stomach cancer s/p chemo, cocaine use, who presented with chest pain, palpitations, diaphoresis. Found to be in wide complex ventricular tachycardia, defibrillated x1 with return to sinus rhythm with some sinus arrhythmia, admitted to ICU    #Ventricular tachycardia  - s/p CAD with 7 stents, not compliant with medications  - symptomatic vtach s/p cardioversion  - s/p lidocaine gtt and adenosine x1 now converted to NSR  - now on coreg  - s/p cardiac cath noted to have EF 15, patient adamantly refusing ICD  - start ACEi/ARB in AM  - trop trend noted, off heparin gtt  - recommend cocaine cessation  - cardio following  - care as per ICU

## 2024-10-16 NOTE — CASE MANAGEMENT PROGRESS NOTE - NSCMPROGRESSNOTE_GEN_ALL_CORE
Chart reviewed from a case management perspective.  Patient currently in ICU s/p diagnostic cath.  Will monitor for needs and remain available.

## 2024-10-16 NOTE — PROGRESS NOTE ADULT - PROBLEM SELECTOR PLAN 4
- Pt with hx of cocaine use, states that he uses it a few times a month, every few months  - F/u Utox  - Avoid beta blocker in setting of cocaine use  - Counseled patient on tobacco and cocaine cessation  - Recommend consult Dr. Young, addiction specialist  - Plan per ICU
- Pt with hx of cocaine use, states that he uses it a few times a month, every few months  - F/u Utox  - Avoid beta blocker in setting of cocaine use  - Counseled patient on tobacco and cocaine cessation  - Recommend consult Dr. Young, addiction specialist  - Plan per ICU

## 2024-10-16 NOTE — PROGRESS NOTE ADULT - PROBLEM SELECTOR PLAN 3
- Chronic  - Cont statin, lipitor 80mg  - Plan per ICU
- Chronic  - Cont statin, lipitor 80mg  - Plan per ICU

## 2024-10-16 NOTE — PROGRESS NOTE ADULT - SUBJECTIVE AND OBJECTIVE BOX
[facility]    Danielle, Ignacio, Aleksandr, Tahir, Jared, Bruno      [phone]    CHIEF COMPLAINT: Patient is a 49y old  Male who presents with a chief complaint of Vtach (15 Oct 2024 19:43)      Follow Up: cmy, vt    Interim history: The patient reports no new symptoms.  Denies chest discomfort and shortness of breath.  No abdominal pain.  No new neurologic symptoms.      MEDICATIONS  (STANDING):  aspirin enteric coated 81 milliGRAM(s) Oral daily  atorvastatin 80 milliGRAM(s) Oral at bedtime  carvedilol 3.125 milliGRAM(s) Oral every 12 hours  enoxaparin Injectable 40 milliGRAM(s) SubCutaneous every 24 hours    MEDICATIONS  (PRN):      REVIEW OF SYSTEMS:  eye, ent, GI, , allergic, dermatologic, musculoskeletal and neurologic are negative except as described above    Vital Signs Last 24 Hrs  T(C): 36.8 (16 Oct 2024 08:01), Max: 37.1 (15 Oct 2024 12:30)  T(F): 98.2 (16 Oct 2024 08:01), Max: 98.8 (15 Oct 2024 12:30)  HR: 69 (16 Oct 2024 11:00) (61 - 90)  BP: 99/58 (16 Oct 2024 11:00) (88/58 - 145/64)  BP(mean): 73 (16 Oct 2024 11:00) (67 - 96)  RR: 16 (16 Oct 2024 11:00) (10 - 24)  SpO2: 96% (16 Oct 2024 11:00) (93% - 97%)    Parameters below as of 16 Oct 2024 08:00  Patient On (Oxygen Delivery Method): room air        I&O's Summary    15 Oct 2024 07:01  -  16 Oct 2024 07:00  --------------------------------------------------------  IN: 159.5 mL / OUT: 1350 mL / NET: -1190.5 mL        Telemetry past 24h: sr    PHYSICAL EXAM:    Constitutional: well-nourished, well-developed, NAD   HEENT:  MMM, sclerae anicteric, conjunctivae clear, no oral cyanosis.  Pulmonary: Non-labored, breath sounds are clear bilaterally, No wheezing, rales or rhonchi  Cardiovascular: Regular, S1 and S2.  No murmur.  No rubs, gallops or clicks  Gastrointestinal: Bowel Sounds present, soft, nontender.   Lymph: No peripheral edema.   Neurological: Alert, no focal deficits  Skin: No rashes.  Psych:  Mood & affect appropriate    LABS: All Labs Reviewed:                        14.7   5.94  )-----------( 328      ( 16 Oct 2024 05:32 )             44.2                         14.0   7.50  )-----------( 322      ( 15 Oct 2024 05:05 )             41.5                         16.1   6.23  )-----------( 408      ( 14 Oct 2024 18:30 )             47.1     16 Oct 2024 05:32    139    |  108    |  18     ----------------------------<  103    3.9     |  26     |  0.66   15 Oct 2024 05:05    143    |  110    |  15     ----------------------------<  98     3.8     |  25     |  0.61   15 Oct 2024 00:01    141    |  110    |  17     ----------------------------<  102    3.8     |  25     |  0.71     Ca    9.3        16 Oct 2024 05:32  Ca    8.9        15 Oct 2024 05:05  Ca    8.8        15 Oct 2024 00:01  Phos  3.3       16 Oct 2024 05:32  Phos  3.7       15 Oct 2024 05:05  Mg     2.1       16 Oct 2024 05:32  Mg     2.2       15 Oct 2024 05:05  Mg     2.1       15 Oct 2024 00:01    TPro  7.4    /  Alb  3.2    /  TBili  0.4    /  DBili  x      /  AST  47     /  ALT  77     /  AlkPhos  41     16 Oct 2024 05:32  TPro  7.0    /  Alb  3.2    /  TBili  0.4    /  DBili  x      /  AST  85     /  ALT  101    /  AlkPhos  41     15 Oct 2024 05:05  TPro  8.2    /  Alb  3.7    /  TBili  0.5    /  DBili  x      /  AST  70     /  ALT  72     /  AlkPhos  49     14 Oct 2024 18:30    PT/INR - ( 15 Oct 2024 05:05 )   PT: 13.4 sec;   INR: 1.15 ratio         PTT - ( 15 Oct 2024 05:05 )  PTT:76.9 sec      Blood Culture:        RADIOLOGY:    EKG:    Echo:    < from: TTE W or WO Ultrasound Enhancing Agent (10.15.24 @ 17:08) >    TRANSTHORACIC ECHOCARDIOGRAM REPORT  ________________________________________________________________________________                                      _______       Pt. Name:       LUIS ACE Study Date:    10/15/2024  MRN:            [id number]     YOB: 1975  Accession #:    [id number]     Age:           49 years  Account#:       [id number]    Gender:        M  Heart Rate:                   Height:        70.08 in (178.00 cm)  Rhythm:                       Weight:        178.57 lb (81.00 kg)  Blood Pressure: 145/64 mmHg   BSA/BMI:       1.99 m² / 25.56 kg/m²  ________________________________________________________________________________________  Referring Physician:    [id number] Rocky Bauman  Interpreting Physician: Kassie Carr MD  Primary Sonographer:    Luis Snow    CPT:                ECHO TTE WO CON COMP W DOPP - 74888.m  Indication(s):      Ventricular tachycardia, unspecified - I47.20  Procedure:          Transthoracic echocardiogram with 2-D, M-mode and complete                      spectral and color flow Doppler.  Ordering Location:  ICU1  Admission Status:   Inpatient  Contrast Injection: Verbal consent was obtained for injection of Ultrasonic                      Enhancing Agent following a discussion of risks and                      benefits.                      Endocardial visualization enhanced with Definity Ultrasound                      enhancing agent.  Study Information:  Image quality for this study is technically difficult.    _______________________________________________________________________________________     CONCLUSIONS:      1. Technically difficult image quality.   2. Left ventricular systolic function is severely decreased with an ejection fraction visually estimated at 20 to 25 %. Regional wall motion abnormalities present. There is overall diffuse hypokinesis however the apex and anteroseptal walls appear akinetic. There is no LV thrombus noted.   3. Mild left ventricular hypertrophy.   4. There is mild (grade 1) left ventricular diastolic dysfunction.   5. Normal right ventricular cavity size and probably normal right ventricular systolic function.   6. Trace mitral regurgitation.   7. The inferior vena cava is normal in size measuring 1.56 cm in diameter, (normal <2.1cm) with normal inspiratory collapse (normal >50%) consistent with normal right atrial pressure (~3, range 0-5mmHg).    ________________________________________________________________________________________  FINDINGS:     Left Ventricle:  Left ventricular systolic function is severely decreased with an ejection fraction visually estimated at 20 to 25%. There are regional wall motion abnormalities present. Mild left ventricular hypertrophy. There is increased LV mass and concentric hypertrophy. There is mild (grade 1) left ventricular diastolic dysfunction.     Right Ventricle:  The right ventricular cavity is normal in size and right ventricular systolic function is probably normal. Tricuspid annular plane systolic excursion (TAPSE) is 2.7 cm (normal >=1.7 cm).     Left Atrium:  The left atrium is normal in size with an indexed volume of 18.84 ml/m².     Right Atrium:  The right atrium is normal in size.     Interatrial Septum:  The interatrial septum appears intact.     Aortic Valve:  The aortic valve anatomy cannot be determined with normal systolic excursion. There is no aortic valve stenosis. There is trace aortic regurgitation.     Mitral Valve:  There is mitral valve thickening of the anterior and posterior leaflets. There is trace mitral regurgitation.     Tricuspid Valve:  There is trace tricuspid regurgitation. There is insufficient tricuspid regurgitation detected to calculate pulmonary artery systolic pressure. Estimated pulmonary artery systolic pressure is 11 mmHg.     Pulmonic Valve:  The pulmonic valve was not well visualized. There is trace pulmonic regurgitation.     Aorta:  The aortic root at the sinuses of Valsalva is normal in size, measuring 3.50 cm (indexed 1.76 cm/m²). The ascending aorta is normal in size, measuring 3.66 cm (indexed 1.84 cm/m²). The aortic arch diameter is normal in size, measuring 2.2 cm (indexed 1.11 cm/m²).     Pericardium:  There is a trace pericardial effusion noted adjacent to the right ventricle.     Systemic Veins:  The inferior vena cava is normal in size measuring 1.56 cm in diameter, (normal <2.1cm) with normal inspiratory collapse (normal >50%) consistent with normal right atrial pressure (~3, range 0-5mmHg).  ____________________________________________________________________  QUANTITATIVE DATA:  Left Ventricle Measurements: (Indexed to BSA)     IVSd (2D):   1.2 cm  LVPWd (2D):  1.2 cm  LVIDd (2D):  5.1 cm  LVIDs (2D):  4.4 cm  LV Mass:     234 g  117.3 g/m²  Visualized LV EF%: 20 to 25%     MV E Vmax:    0.54 m/s  MV A Vmax:    0.84 m/s  MV E/A:       0.64  e' lateral:   4.03 cm/s  e' medial:    5.44 cm/s  E/e' lateral: 13.28  E/e' medial:  9.83  E/e' Average: 11.30  MV DT:        225 msec    Aorta Measurements: (Normal range) (Indexed to BSA)     Ao Root d     3.50 cm (3.1 - 3.7 cm) 1.76 cm/m²  Ao Asc d, 2D: 3.10  Ao Asc prox:  3.66 cm                1.84 cm/m²  Ao Arch:      2.2 cm            Left Atrium Measurements: (Indexed to BSA)  LA Diam 2D: 4.30 cm         Right Ventricle Measurements:     TAPSE: 2.7 cm       LVOT / RVOT/ Qp/Qs Data: (Indexed to BSA)  LVOT Diameter: 2.40 cm  LVOT Area:     4.52 cm²    Mitral Valve Measurements:     MV E Vmax: 0.5 m/s  MV A Vmax: 0.8 m/s  MV E/A:    0.6       Tricuspid Valve Measurements:     TR Vmax:          1.4 m/s  TR Peak Gradient: 8.3 mmHg  RA Pressure:      3 mmHg  PASP:             11 mmHg    ________________________________________________________________________________________  Electronically signed on 10/16/2024 at 10:35:40 AM by Kassie Carr MD         *** Final ***    < end of copied text >

## 2024-10-16 NOTE — CHART NOTE - NSCHARTNOTEFT_GEN_A_CORE
Post Diagnostic Cardiac Catheterization Follow-up/ RRA Site Chart Note      Patient without complaints. Denies CP, SOB, palpitations, N/V, fever/chills, abd pain, numbness/tingling/weakness, other c/o at this time.    A+O x 3, neurologically intact  RRA access site stable (clean, dry, intact, without bleeding, heat, erythema, or hematoma). Dressing removed.  RUE motor, neuro, circ intact.  Hemodynamically stable, neurologically intact, VS stable, afebrile    A/P: s/p diagnostic C  mild CAD: medical mgmt  post cath access site precautions reviewed with pt who verbalized good understanding  see discharge note for post diagnostic cath instructions/plan   remainder of plan per primary team/non-interventional cardiology  Interventional Cardiology signing off, please reconsult if needed. Thank you.

## 2024-10-16 NOTE — PROGRESS NOTE ADULT - PROBLEM SELECTOR PLAN 1
- Pt with PMHx NSTEMI s/p 7 stents, admitted for symptomatic VTach, s/p defibrillation/cardioversion 200J - likely ischemic heart disease in the setting of cocaine abuse and CAD  - Trop wnl x1, 2nd trop 2185  - EKG on arrival: wide QRS tachycardia with PVCs, right ventricular hypertrophy, ventricular rate 266, QTc 421 --> sinus rhythm with PVcs, old inferior and old anterolateral infarcts, ventricular rate 84 --> sinus rhythm with sinus arrhythmia, low voltage QRS, old inferior and old anterolateral infarcts, ventricular rate 68  - S/p adenosine 6mg IV, ASA 325mg PO, etomidate 10mg IV, NS 1L IV bolus, lidocaine drip 1mg/min  - Admitted to ICU for further management, at risk for decompensation  - Heparin gtt d/c, cont asa, statin  - D/c lidocaine infusion and starting coreg 3.125 per Dr Carr Cardio  - Starting ACE/ARB in AM   - LHC performed --> EF 15% LVgram, mod diffuse atherosclerosis, dLCX 90% 90% stenosis, EP eval for ICD placement  - Pt amenable to AICD, will be transferred to [facility]  - Avoid beta blocker in setting of cocaine use  - Pantoprazole for GI ppx  - F/u TTE results  - Trend creatinine and electrolytes. Keep K>4, mg>2  - Very high risk of decompensation  - Cardio Dr. Carr consulted, recs appreciated  - Plan per ICU team - Pt with PMHx NSTEMI s/p 7 stents, admitted for symptomatic VTach, s/p defibrillation/cardioversion 200J - likely ischemic heart disease in the setting of cocaine abuse and CAD  - Trop wnl x1, 2nd trop 2185  - EKG on arrival: wide QRS tachycardia with PVCs, right ventricular hypertrophy, ventricular rate 266, QTc 421 --> sinus rhythm with PVcs, old inferior and old anterolateral infarcts, ventricular rate 84 --> sinus rhythm with sinus arrhythmia, low voltage QRS, old inferior and old anterolateral infarcts, ventricular rate 68  - S/p adenosine 6mg IV, ASA 325mg PO, etomidate 10mg IV, NS 1L IV bolus, lidocaine drip 1mg/min  - Admitted to ICU for further management, at risk for decompensation  - Heparin gtt d/c, cont asa, statin  - D/c lidocaine infusion and starting coreg 3.125 per Dr Carr Cardio  - Starting ACE/ARB this AM   - C performed --> EF 15% LVgram, mod diffuse atherosclerosis, dLCX 90% 90% stenosis, EP eval for ICD placement  - Pt amenable to AICD, will be transferred to [facility]  - Avoid beta blocker in setting of cocaine use  - Pantoprazole for GI ppx  - F/u TTE results  - Trend creatinine and electrolytes. Keep K>4, mg>2  - Very high risk of decompensation  - Cardio Dr. Carr consulted, recs appreciated  - Plan per ICU team, possible transfer to [facility]

## 2024-10-16 NOTE — CHART NOTE - NSCHARTNOTEFT_GEN_A_CORE
Called to see pt as his is unwilling to wait for transfer for ICD placement. Multiple times I explained the importance of ICD and his risk of sudden cardiac death at any time, but he insists on leaving AMA.  He is A and O x 4, understands that he can  suddenly if he leaves the hospital.  Discussed with pt in presence of RN.    I discussed with pt that he needs to make appt with his cardiologist ASAP.  I called his outpt cards office earlier in day but the office was closed.    I gave him copies of his echo, cath report, and EKGs.    Instructed him not to smoke, use cocaine and should not drive until he sees his cardiologist.    Rx for ASA, coreg, lipitor sent to [facility].  Pt completed AMA form and left.

## 2024-10-16 NOTE — PROGRESS NOTE ADULT - CRITICAL CARE ATTENDING COMMENT
Upon my evaluation, this patient is at high risk for imminent or life threatening deterioration due to hf, vt  and other active medical issues which require my direct attention, intervention, and personal management.  I have personally spent >35 minutes  of critical care time exclusive of time spent on separate billing procedures. This includes review of laboratory data, radiology results, discussion with primary team\patient, and monitoring for potential decompensation Interventions were performed as documented above.
Upon my evaluation, this patient is at high risk for imminent or life threatening deterioration due to VT and other active medical issues which require my direct attention, intervention, and personal management.  I have personally spent >30 minutes  of critical care time exclusive of time spent on separate billing procedures. This includes review of laboratory data, radiology results, discussion with primary team\patient, and monitoring for potential decompensation. Interventions were performed as documented above.

## 2024-10-16 NOTE — CARE COORDINATION ASSESSMENT. - NSCAREPROVIDERS_GEN_ALL_CORE_FT
CARE PROVIDERS:  Accepting Physician: Rocky Bauman  Access Services: Sherie Oconnor  Access Services: Kyaw Mccauley  Administration: Holden De La Vega  Administration: Linette Manzo  Administration: Harriett Kuhn  Administration: Jam West  Administration: Emmanuel Nguyen  Administration: Hemal Ty  Admitting: Rocky Bauman  Attending: Rocky Bauman  Consultant: Ken Kaur  Consultant: Ravi Isaac  Consultant: Terence Pierre  Consultant: Daniel Coleman  Consultant: Shubham Dominguez  Consultant: Kassie Carr  Consultant: Matthieu Ly  Covering Team: Sheryl Hyman  ED Attending: Manjinder Salas ED Nurse: Aly Smith  Nurse: Ramiro Segura  Nurse: Debbie Asencio  Nurse: Gina Quigley  Ordered: ServiceAccount, SCMMLM  Ordered: Dicono, Tacho  Ordered: ADM, User  Ordered: Physician, Ordering  Ordered: Doctor, Unknown  Override: Maria A Boogie  Override: Antwan Mccauley  PCA/Nursing Assistant: Maria T Reese  Primary Team: Rishi Brumfield  Primary Team: Flaco Alba  Primary Team: Simi Kimball  Primary Team: Eleazar Will  Primary Team: Aly Joaquin  Primary Team: Faina Lewis  Primary Team: Jairo Johnson  Primary Team: Cordelia Martin  Primary Team: French Villalba  Quality Review: Mlodynia, April  Registered Dietitian: Amy Oreilly  Respiratory Therapy: Vick Stoddard  : Viki Adam  Student: Jamar Meehan  Team: [facility], Team  UR// Supp. Assoc.: Jennifer Ledesma

## 2024-10-16 NOTE — CARE COORDINATION ASSESSMENT. - NSPASTMEDSURGHISTORY_GEN_ALL_CORE_FT
PAST MEDICAL & SURGICAL HISTORY:  NSTEMI (non-ST elevation myocardial infarction)      Stomach cancer      DVT of upper extremity (deep vein thrombosis)      CAD (coronary artery disease)      HLD (hyperlipidemia)      Cocaine abuse      Stented coronary artery

## 2024-10-16 NOTE — PROGRESS NOTE ADULT - TIME BILLING
Reviewing chart notes and data, reviewing telemetry monitor records, face to face time counseling the patient, coordinating care with SW/CM at [facility].   Pt seen and examined. Case discussed with resident. Agree with assessment and plan above (edited by me in detail)
Time spent includes direct patient care  (interview and examination of patient), discussion with other providers, support staff and/or patient's family members, review of medical records, ordering diagnostic tests and analyzing results, and documentation.

## 2024-10-16 NOTE — PROGRESS NOTE ADULT - PROVIDER SPECIALTY LIST ADULT
Critical Care
Critical Care
Hospitalist
Cardiology
Cardiology
Detail Level: Zone
Render Risk Assessment In Note?: no
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 Repigmentation confined within the scar, will monitor. Pt instructed to contact office if pigmentation rapidly changes or extends beyond the scar margins.
Hospitalist

## 2024-10-16 NOTE — PROGRESS NOTE ADULT - ASSESSMENT
49 year old male with CAD (reports 7 stents and MIs in past), here with dizziness/palpitations/dyspnea/tachycardia.  On arrival, appeared diaphoretic, with nausea/vomiting, and tachycardic  Found to be in VT.     #VT: Presented with palpitations, diaphoresis nausea, vomiting, found to be in a WCT, likely VT  - K 4.1 on arrival, Mg 2.3  - Initiate on lidocaine gtt  - LHC done this AM with moderate diffuse atherosclerosis. Patent stents in LAD and D2 with mild ISR, dLCX with 90% stenosis distal to bifurcation with OM1, distal vessel is small and diffusely disease  - Continue ASA, statin  - Obtain lipid panel, a1c  - LVEDP of 26 this AM, s/p 40mg IV lasix  - Would repeat IV lasix this afternoon  - LVgram with LVEF of 15%  - EP evaluation underway, but patient refusing ICD at this time  - GDMT with Coreg for now  - ACE/ARB in AM  - Can hold lidocaine gtt  - Trops 34-->2100-->7300-->7400, trend to peak  - Can hold heparin gtt  - Obtain formal TTE    #NICM, LVEF ~15%  - BB as above  - Counseled on drug cessation.   - TTE as above  - ACE/ARB/ARNI in AM    Will follow closely. 
Cardio:  #Arrhythmia  - Found to have wide complex QSR Vtach in ER  - Started on lidocaine drip, given adenosine, cardioverted in ER to nsr  - D/c lidocaine and begin coreg 3.125 per Dr. Carr  - Cocaine user so proceed with caution regarding beta blockers     #HF 15%  - dLCX with 90% stenosis distal to bifurcation with OM1, distal vessel  is small and diffusely disease  - LVEDP 26, LVgram EF 15%   - Continue coreg 3.125  - Start ACE/ARB in AM  - Cardio following, recs appreciated    #Ischemia  - Troponin 2100 --> 7400 continue to trend cardiac markers  - D/c heparin drip  - Continue ASA, statin      Pulm:  No concerns      GI:  Normal diet   Continue protonix  AST ALT 85 101, continue to follow    Nephro:   Kidney function wnl  Continue monitoring renal indices      Neuro:  Alert and oriented, mentating well      ID:  Infection unlikely
49 year old male with CAD (reports 7 stents and MIs in past), here with dizziness/palpitations/dyspnea/tachycardia.  On arrival, appeared diaphoretic, with nausea/vomiting, and tachycardic  Found to be in VT.     #VT: Presented with palpitations, diaphoresis nausea, vomiting, found to be in a WCT, likely VT  - K 4.1 on arrival, Mg 2.3  - Initiate on lidocaine gtt  - LHC done this AM with moderate diffuse atherosclerosis. Patent stents in LAD and D2 with mild ISR, dLCX with 90% stenosis distal to bifurcation with OM1, distal vessel is small and diffusely disease  - Continue ASA, statin  - Obtain lipid panel, a1c  - LVEDP of 26 this AM, s/p 40mg IV lasix  - Would repeat IV lasix this afternoon  - LVgram with LVEF of 15%  - EP evaluation underway, but patient refusing ICD at this time  - GDMT with Coreg for now  - ACE/ARB in AM  - offlidocaine gtt  - Trops 34-->2100-->7300-->7400-> 1826  - Can hold heparin gtt  - TTE noted, ef 20-25    #NICM   - BB as above  - Counseled on drug cessation.   - TTE as above  - ACE/ARB/ARNI in AM    -agreeable to icd implant, transfer in progress   
49 year old male with PMHx NSTEMI (2001, 2017) s/p 7 stents, coronary artery disease, HLD, prior upper extremity DVT, recent stomach cancer s/p chemo, cocaine use, who presented with chest pain, palpitations, diaphoresis. Found to be in wide complex ventricular tachycardia, defibrillated x1 with return to sinus rhythm with some sinus arrhythmia. Admitted to ICU for further management.
49 year old male with PMHx NSTEMI (2001, 2017) s/p 7 stents, coronary artery disease, HLD, prior upper extremity DVT, recent stomach cancer s/p chemo, cocaine use, who presented with chest pain, palpitations, diaphoresis. Found to be in wide complex ventricular tachycardia, defibrillated x1 with return to sinus rhythm with some sinus arrhythmia. Admitted to ICU for further management.

## 2024-10-16 NOTE — PROGRESS NOTE ADULT - SUBJECTIVE AND OBJECTIVE BOX
Patient is a 49y old  Male who presents with a chief complaint of Vtach (16 Oct 2024 11:43)      Subjective:  INTERVAL HPI/OVERNIGHT EVENTS: Patient seen and examined at bedside. No overnight events occurred. Patient has no complaints at this time, patient is amenable to having AICD at [facility]. Pt otherwise denies fevers, chills, headache, lightheadedness, chest pain, dyspnea, abdominal pain, n/v/d/c.    MEDICATIONS  (STANDING):  aspirin enteric coated 81 milliGRAM(s) Oral daily  atorvastatin 80 milliGRAM(s) Oral at bedtime  carvedilol 3.125 milliGRAM(s) Oral every 12 hours  enoxaparin Injectable 40 milliGRAM(s) SubCutaneous every 24 hours    MEDICATIONS  (PRN):      Allergies    No Known Allergies    Intolerances        REVIEW OF SYSTEMS:  CONSTITUTIONAL: No fever or chills  HEENT:  No headache, no sore throat  RESPIRATORY: No cough, wheezing, or shortness of breath  CARDIOVASCULAR: No chest pain, palpitations  GASTROINTESTINAL: No abd pain, nausea, vomiting, or diarrhea  GENITOURINARY: No dysuria, frequency, or hematuria  NEUROLOGICAL: no focal weakness or dizziness  MUSCULOSKELETAL: no myalgias     Objective:  Vital Signs Last 24 Hrs  T(C): 36.8 (16 Oct 2024 11:53), Max: 37.1 (15 Oct 2024 13:00)  T(F): 98.2 (16 Oct 2024 11:53), Max: 98.8 (15 Oct 2024 13:00)  HR: 69 (16 Oct 2024 11:00) (64 - 90)  BP: 99/58 (16 Oct 2024 11:00) (88/58 - 145/64)  BP(mean): 73 (16 Oct 2024 11:00) (67 - 96)  RR: 16 (16 Oct 2024 11:00) (10 - 24)  SpO2: 96% (16 Oct 2024 11:00) (93% - 97%)    Parameters below as of 16 Oct 2024 08:00  Patient On (Oxygen Delivery Method): room air        GENERAL: NAD, lying in bed comfortably  CHEST/LUNG: Clear to auscultation bilaterally; No rales, rhonchi, wheezing, or rubs. Unlabored respirations  HEART: Regular rate and rhythm; No murmurs, rubs, or gallops  ABDOMEN: Bowel sounds present; Soft, Nontender, Nondistended. No hepatomegaly  EXTREMITIES:  2+ Peripheral Pulses, brisk capillary refill. No clubbing, cyanosis, or edema  NERVOUS SYSTEM:  Alert & Oriented X3, speech clear. No deficits   MSK: FROM all 4 extremities, full and equal strength  SKIN: No rashes or lesions      LABS:                        14.7   5.94  )-----------( 328      ( 16 Oct 2024 05:32 )             44.2     CBC Full  -  ( 16 Oct 2024 05:32 )  WBC Count : 5.94 K/uL  Hemoglobin : 14.7 g/dL  Hematocrit : 44.2 %  Platelet Count - Automated : 328 K/uL  Mean Cell Volume : 94.0 fl  Mean Cell Hemoglobin : 31.3 pg  Mean Cell Hemoglobin Concentration : 33.3 gm/dL  Auto Neutrophil # : 3.03 K/uL  Auto Lymphocyte # : 1.98 K/uL  Auto Monocyte # : 0.66 K/uL  Auto Eosinophil # : 0.20 K/uL  Auto Basophil # : 0.06 K/uL  Auto Neutrophil % : 51.0 %  Auto Lymphocyte % : 33.3 %  Auto Monocyte % : 11.1 %  Auto Eosinophil % : 3.4 %  Auto Basophil % : 1.0 %    16 Oct 2024 05:32    139    |  108    |  18     ----------------------------<  103    3.9     |  26     |  0.66     Ca    9.3        16 Oct 2024 05:32  Phos  3.3       16 Oct 2024 05:32  Mg     2.1       16 Oct 2024 05:32    TPro  7.4    /  Alb  3.2    /  TBili  0.4    /  DBili  x      /  AST  47     /  ALT  77     /  AlkPhos  41     16 Oct 2024 05:32    PT/INR - ( 15 Oct 2024 05:05 )   PT: 13.4 sec;   INR: 1.15 ratio         PTT - ( 15 Oct 2024 05:05 )  PTT:76.9 sec  Urinalysis Basic - ( 16 Oct 2024 05:32 )    Color: x / Appearance: x / SG: x / pH: x  Gluc: 103 mg/dL / Ketone: x  / Bili: x / Urobili: x   Blood: x / Protein: x / Nitrite: x   Leuk Esterase: x / RBC: x / WBC x   Sq Epi: x / Non Sq Epi: x / Bacteria: x      CAPILLARY BLOOD GLUCOSE              RADIOLOGY & ADDITIONAL TESTS:    Personally reviewed.     Consultant(s) Notes Reviewed:  [x] YES  [ ] NO

## 2024-10-16 NOTE — CHART NOTE - NSCHARTNOTESELECT_GEN_ALL_CORE
AMA/Event Note
Electrophysiology/Event Note
Interventional Cardiology/Event Note
Interventional Cardiology/Event Note

## 2024-10-16 NOTE — PROGRESS NOTE ADULT - SUBJECTIVE AND OBJECTIVE BOX
Interval events:   no events overnight  no CP/SOB/palps  ambulating around ICU without difficulty      T(F): 97.8 (10-16-24 @ 16:29), Max: 98.2 (10-16-24 @ 08:01)  HR: 69 (10-16-24 @ 11:00) (64 - 90)  BP: 126/63 (10-16-24 @ 14:59) (88/58 - 145/64)  RR: 16 (10-16-24 @ 11:00) (10 - 24)  SpO2: 96% (10-16-24 @ 11:00) (93% - 96%)  Wt(kg): --      10-15-24 @ 07:01  -  10-16-24 @ 07:00  --------------------------------------------------------  IN: 159.5 mL / OUT: 1350 mL / NET: -1190.5 mL        CAPILLARY BLOOD GLUCOSE      POCT Blood Glucose.: 169 mg/dL (14 Oct 2024 18:23)      I&O's Detail    15 Oct 2024 07:01  -  16 Oct 2024 07:00  --------------------------------------------------------  IN:    Heparin Infusion: 9.5 mL    Lidocaine: 30 mL    Oral Fluid: 120 mL  Total IN: 159.5 mL    OUT:    Voided (mL): 1350 mL  Total OUT: 1350 mL    Total NET: -1190.5 mL          Physical Exam:   Gen:   Neuro:  HEENT:  Resp:  CVS:  Abd:  Ext:  Skin:    Meds:    carvedilol Oral    atorvastatin Oral          aspirin enteric coated Oral  enoxaparin Injectable SubCutaneous                                        14.7   5.94  )-----------( 328      ( 16 Oct 2024 05:32 )             44.2       10-16    139  |  108  |  18  ----------------------------<  103[H]  3.9   |  26  |  0.66    Ca    9.3      16 Oct 2024 05:32  Phos  3.3     10-16  Mg     2.1     10-16    TPro  7.4  /  Alb  3.2[L]  /  TBili  0.4  /  DBili  x   /  AST  47[H]  /  ALT  77  /  AlkPhos  41  10-16          PT/INR - ( 15 Oct 2024 05:05 )   PT: 13.4 sec;   INR: 1.15 ratio         PTT - ( 15 Oct 2024 05:05 )  PTT:76.9 sec  Urinalysis Basic - ( 16 Oct 2024 05:32 )    Color: x / Appearance: x / SG: x / pH: x  Gluc: 103 mg/dL / Ketone: x  / Bili: x / Urobili: x   Blood: x / Protein: x / Nitrite: x   Leuk Esterase: x / RBC: x / WBC x   Sq Epi: x / Non Sq Epi: x / Bacteria: x                  Radiology:    Bedside ultrasound:    Tubes/lines:      GLOBAL ISSUE/BEST PRACTICE:  Analgesia:  Sedation:  HOB elevation: yes  Stress ulcer prophylaxis:  VTE prophylaxis:  Glycemic control:  Nutrition:    CODE STATUS:         Interval events:   no events overnight  no CP/SOB/palps  ambulating around ICU without difficulty      T(F): 97.8 (10-16-24 @ 16:29), Max: 98.2 (10-16-24 @ 08:01)  HR: 69 (10-16-24 @ 11:00) (64 - 90)  BP: 126/63 (10-16-24 @ 14:59) (88/58 - 145/64)  RR: 16 (10-16-24 @ 11:00) (10 - 24)  SpO2: 96% (10-16-24 @ 11:00) (93% - 96%)  Wt(kg): --      10-15-24 @ 07:01  -  10-16-24 @ 07:00  --------------------------------------------------------  IN: 159.5 mL / OUT: 1350 mL / NET: -1190.5 mL        CAPILLARY BLOOD GLUCOSE      POCT Blood Glucose.: 169 mg/dL (14 Oct 2024 18:23)      I&O's Detail    15 Oct 2024 07:01  -  16 Oct 2024 07:00  --------------------------------------------------------  IN:    Heparin Infusion: 9.5 mL    Lidocaine: 30 mL    Oral Fluid: 120 mL  Total IN: 159.5 mL    OUT:    Voided (mL): 1350 mL  Total OUT: 1350 mL    Total NET: -1190.5 mL          Physical Exam:   Gen: well appearing, NAD  Neuro: alert, o x 3  Resp: CTABL  CVS: RRR, normal S1 S2  Abd: soft, NTND  Ext: no edema  Skin: WWP    Meds:    carvedilol Oral    atorvastatin Oral          aspirin enteric coated Oral  enoxaparin Injectable SubCutaneous                                        14.7   5.94  )-----------( 328      ( 16 Oct 2024 05:32 )             44.2       10-16    139  |  108  |  18  ----------------------------<  103[H]  3.9   |  26  |  0.66    Ca    9.3      16 Oct 2024 05:32  Phos  3.3     10-16  Mg     2.1     10-16    TPro  7.4  /  Alb  3.2[L]  /  TBili  0.4  /  DBili  x   /  AST  47[H]  /  ALT  77  /  AlkPhos  41  10-16          PT/INR - ( 15 Oct 2024 05:05 )   PT: 13.4 sec;   INR: 1.15 ratio         PTT - ( 15 Oct 2024 05:05 )  PTT:76.9 sec  Urinalysis Basic - ( 16 Oct 2024 05:32 )    Color: x / Appearance: x / SG: x / pH: x  Gluc: 103 mg/dL / Ketone: x  / Bili: x / Urobili: x   Blood: x / Protein: x / Nitrite: x   Leuk Esterase: x / RBC: x / WBC x   Sq Epi: x / Non Sq Epi: x / Bacteria: x      Radiology:  < from: Xray Chest 1 View- PORTABLE-Urgent (10.14.24 @ 19:11) >  INTERPRETATION:  AP semierect chest on October 14, 2024 at 7:03 PM.   Patient is short of breath.    Heart magnified by technique.    On July 3, 2013 there was some infiltration of the lower left heart   border which is markedly improved on this study.    IMPRESSION: Prior infiltrate off left lower heart border markedly   improved.    --- End of Report ---    < end of copied text >  < from: TTE W or WO Ultrasound Enhancing Agent (10.15.24 @ 17:08) >  _______     CONCLUSIONS:      1. Technically difficult image quality.   2. Left ventricular systolic function is severely decreased with an ejection fraction visually estimated at 20 to 25 %. Regional wall motion abnormalities present. There is overall diffuse hypokinesis however the apex and anteroseptal walls appear akinetic. There is no LV thrombus noted.   3. Mild left ventricular hypertrophy.   4. There is mild (grade 1) left ventricular diastolic dysfunction.   5. Normal right ventricular cavity size and probably normal right ventricular systolic function.   6. Trace mitral regurgitation.   7. The inferior vena cava is normal in size measuring 1.56 cm in diameter, (normal <2.1cm) with normal inspiratory collapse (normal >50%) consistent with normal right atrial pressure (~3, range 0-5mmHg).    < end of copied text >      Tubes/lines:  none    GLOBAL ISSUE/BEST PRACTICE:  Analgesia: NA  Sedation: NA  HOB elevation: yes  Stress ulcer prophylaxis: NA  VTE prophylaxis: Y  Glycemic control: Y  Nutrition: Y    CODE STATUS: FULL

## 2024-10-16 NOTE — PROGRESS NOTE ADULT - PROBLEM SELECTOR PLAN 2
- Chronic  - Pt formerly took statin, losartan, metoprolol, now takes asa 81mg inconsistently  - Avoid beta blocker in setting of cocaine use  - Cont 81 mg daily and statin  - Plan per ICU

## 2024-10-17 VITALS — TEMPERATURE: 98 F

## 2024-11-29 ENCOUNTER — APPOINTMENT (OUTPATIENT)
Dept: ELECTROPHYSIOLOGY | Facility: CLINIC | Age: 49
End: 2024-11-29
Payer: COMMERCIAL

## 2024-11-29 ENCOUNTER — NON-APPOINTMENT (OUTPATIENT)
Age: 49
End: 2024-11-29

## 2024-11-29 VITALS — HEART RATE: 74 BPM | DIASTOLIC BLOOD PRESSURE: 76 MMHG | OXYGEN SATURATION: 97 % | SYSTOLIC BLOOD PRESSURE: 112 MMHG

## 2024-11-29 VITALS — HEIGHT: 67 IN

## 2024-11-29 DIAGNOSIS — I25.9 CHRONIC ISCHEMIC HEART DISEASE, UNSPECIFIED: ICD-10-CM

## 2024-11-29 DIAGNOSIS — F17.200 NICOTINE DEPENDENCE, UNSPECIFIED, UNCOMPLICATED: ICD-10-CM

## 2024-11-29 DIAGNOSIS — I82.629 ACUTE EMBOLISM AND THROMBOSIS OF DEEP VEINS OF UNSPECIFIED UPPER EXTREMITY: ICD-10-CM

## 2024-11-29 DIAGNOSIS — Z83.3 FAMILY HISTORY OF DIABETES MELLITUS: ICD-10-CM

## 2024-11-29 DIAGNOSIS — Z82.49 FAMILY HISTORY OF ISCHEMIC HEART DISEASE AND OTHER DISEASES OF THE CIRCULATORY SYSTEM: ICD-10-CM

## 2024-11-29 DIAGNOSIS — F14.90 COCAINE USE, UNSPECIFIED, UNCOMPLICATED: ICD-10-CM

## 2024-11-29 DIAGNOSIS — I25.5 ISCHEMIC CARDIOMYOPATHY: ICD-10-CM

## 2024-11-29 DIAGNOSIS — Z92.21 PERSONAL HISTORY OF ANTINEOPLASTIC CHEMOTHERAPY: ICD-10-CM

## 2024-11-29 DIAGNOSIS — R00.0 TACHYCARDIA, UNSPECIFIED: ICD-10-CM

## 2024-11-29 DIAGNOSIS — Z85.028 PERSONAL HISTORY OF OTHER MALIGNANT NEOPLASM OF STOMACH: ICD-10-CM

## 2024-11-29 DIAGNOSIS — E78.5 HYPERLIPIDEMIA, UNSPECIFIED: ICD-10-CM

## 2024-11-29 PROCEDURE — 99204 OFFICE O/P NEW MOD 45 MIN: CPT | Mod: 25

## 2024-11-29 PROCEDURE — 93000 ELECTROCARDIOGRAM COMPLETE: CPT

## 2024-11-29 RX ORDER — CARVEDILOL 12.5 MG/1
12.5 TABLET, FILM COATED ORAL
Qty: 225 | Refills: 1 | Status: ACTIVE | COMMUNITY
Start: 2024-11-29 | End: 1900-01-01

## 2024-11-29 RX ORDER — ASPIRIN ENTERIC COATED TABLETS 81 MG 81 MG/1
81 TABLET, DELAYED RELEASE ORAL
Qty: 30 | Refills: 0 | Status: ACTIVE | COMMUNITY
Start: 2024-11-29

## 2024-11-29 RX ORDER — CARVEDILOL 3.12 MG/1
3.12 TABLET, FILM COATED ORAL TWICE DAILY
Refills: 0 | Status: DISCONTINUED | COMMUNITY
Start: 2024-11-29 | End: 2024-11-29

## 2024-11-29 RX ORDER — SACUBITRIL AND VALSARTAN 24; 26 MG/1; MG/1
24-26 TABLET, FILM COATED ORAL TWICE DAILY
Qty: 120 | Refills: 0 | Status: ACTIVE | COMMUNITY
Start: 2024-11-29 | End: 1900-01-01

## 2024-11-29 RX ORDER — LOSARTAN POTASSIUM 25 MG/1
25 TABLET, FILM COATED ORAL DAILY
Qty: 90 | Refills: 2 | Status: DISCONTINUED | COMMUNITY
Start: 2024-11-29 | End: 2024-11-29

## 2024-11-29 RX ORDER — SPIRONOLACTONE 25 MG/1
25 TABLET ORAL DAILY
Qty: 30 | Refills: 0 | Status: DISCONTINUED | COMMUNITY
Start: 2024-11-29 | End: 2024-11-29

## 2024-11-29 RX ORDER — ATORVASTATIN CALCIUM 80 MG/1
80 TABLET, FILM COATED ORAL
Qty: 90 | Refills: 0 | Status: ACTIVE | COMMUNITY
Start: 2024-11-29

## 2024-11-29 RX ORDER — CLOPIDOGREL BISULFATE 75 MG/1
75 TABLET, FILM COATED ORAL DAILY
Qty: 90 | Refills: 3 | Status: ACTIVE | COMMUNITY
Start: 2024-11-29

## 2025-08-07 LAB — HBA1C MFR BLD HPLC: 5.8

## 2025-08-11 DIAGNOSIS — Z00.00 ENCOUNTER FOR GENERAL ADULT MEDICAL EXAMINATION W/OUT ABNORMAL FINDINGS: ICD-10-CM
